# Patient Record
Sex: MALE
[De-identification: names, ages, dates, MRNs, and addresses within clinical notes are randomized per-mention and may not be internally consistent; named-entity substitution may affect disease eponyms.]

---

## 2019-03-22 PROBLEM — Z00.00 ENCOUNTER FOR PREVENTIVE HEALTH EXAMINATION: Status: ACTIVE | Noted: 2019-03-22

## 2019-04-08 ENCOUNTER — APPOINTMENT (OUTPATIENT)
Dept: COLORECTAL SURGERY | Facility: CLINIC | Age: 59
End: 2019-04-08

## 2019-05-03 ENCOUNTER — APPOINTMENT (OUTPATIENT)
Dept: COLORECTAL SURGERY | Facility: CLINIC | Age: 59
End: 2019-05-03

## 2025-03-04 ENCOUNTER — INPATIENT (INPATIENT)
Facility: HOSPITAL | Age: 65
LOS: 1 days | Discharge: HOME HEALTH | End: 2025-03-06
Attending: STUDENT IN AN ORGANIZED HEALTH CARE EDUCATION/TRAINING PROGRAM | Admitting: STUDENT IN AN ORGANIZED HEALTH CARE EDUCATION/TRAINING PROGRAM
Payer: COMMERCIAL

## 2025-03-04 ENCOUNTER — NON-APPOINTMENT (OUTPATIENT)
Age: 65
End: 2025-03-04

## 2025-03-04 ENCOUNTER — APPOINTMENT (OUTPATIENT)
Dept: UROLOGY | Facility: CLINIC | Age: 65
End: 2025-03-04
Payer: COMMERCIAL

## 2025-03-04 VITALS
DIASTOLIC BLOOD PRESSURE: 71 MMHG | HEART RATE: 67 BPM | WEIGHT: 185 LBS | BODY MASS INDEX: 28.04 KG/M2 | TEMPERATURE: 98.1 F | HEIGHT: 68 IN | SYSTOLIC BLOOD PRESSURE: 145 MMHG

## 2025-03-04 VITALS
WEIGHT: 184.97 LBS | RESPIRATION RATE: 20 BRPM | DIASTOLIC BLOOD PRESSURE: 69 MMHG | HEART RATE: 72 BPM | OXYGEN SATURATION: 98 % | TEMPERATURE: 98 F | SYSTOLIC BLOOD PRESSURE: 129 MMHG

## 2025-03-04 DIAGNOSIS — R82.81 PYURIA: ICD-10-CM

## 2025-03-04 DIAGNOSIS — N39.0 URINARY TRACT INFECTION, SITE NOT SPECIFIED: ICD-10-CM

## 2025-03-04 DIAGNOSIS — R33.9 RETENTION OF URINE, UNSPECIFIED: ICD-10-CM

## 2025-03-04 DIAGNOSIS — R35.89 OTHER POLYURIA: ICD-10-CM

## 2025-03-04 DIAGNOSIS — N17.9 ACUTE KIDNEY FAILURE, UNSPECIFIED: ICD-10-CM

## 2025-03-04 DIAGNOSIS — D64.9 ANEMIA, UNSPECIFIED: ICD-10-CM

## 2025-03-04 DIAGNOSIS — E87.29 OTHER ACIDOSIS: ICD-10-CM

## 2025-03-04 DIAGNOSIS — E87.5 HYPERKALEMIA: ICD-10-CM

## 2025-03-04 DIAGNOSIS — Z29.9 ENCOUNTER FOR PROPHYLACTIC MEASURES, UNSPECIFIED: ICD-10-CM

## 2025-03-04 LAB
ADD ON TEST-SPECIMEN IN LAB: SIGNIFICANT CHANGE UP
ANION GAP SERPL CALC-SCNC: 14 MMOL/L — SIGNIFICANT CHANGE UP (ref 5–17)
ANION GAP SERPL CALC-SCNC: 20 MMOL/L — HIGH (ref 5–17)
APPEARANCE UR: ABNORMAL
BASOPHILS # BLD AUTO: 0.03 K/UL — SIGNIFICANT CHANGE UP (ref 0–0.2)
BASOPHILS NFR BLD AUTO: 0.3 % — SIGNIFICANT CHANGE UP (ref 0–2)
BILIRUB UR-MCNC: NEGATIVE — SIGNIFICANT CHANGE UP
BUN SERPL-MCNC: 109 MG/DL — HIGH (ref 7–23)
BUN SERPL-MCNC: 84 MG/DL — HIGH (ref 7–23)
CALCIUM SERPL-MCNC: 9.4 MG/DL — SIGNIFICANT CHANGE UP (ref 8.4–10.5)
CALCIUM SERPL-MCNC: 9.5 MG/DL — SIGNIFICANT CHANGE UP (ref 8.4–10.5)
CHLORIDE SERPL-SCNC: 106 MMOL/L — SIGNIFICANT CHANGE UP (ref 96–108)
CHLORIDE SERPL-SCNC: 112 MMOL/L — HIGH (ref 96–108)
CO2 SERPL-SCNC: 20 MMOL/L — LOW (ref 22–31)
CO2 SERPL-SCNC: 23 MMOL/L — SIGNIFICANT CHANGE UP (ref 22–31)
COLOR SPEC: YELLOW — SIGNIFICANT CHANGE UP
CREAT SERPL-MCNC: 12.32 MG/DL — HIGH (ref 0.5–1.3)
CREAT SERPL-MCNC: 6.55 MG/DL — HIGH (ref 0.5–1.3)
DIFF PNL FLD: ABNORMAL
EGFR: 4 ML/MIN/1.73M2 — LOW
EGFR: 4 ML/MIN/1.73M2 — LOW
EGFR: 9 ML/MIN/1.73M2 — LOW
EGFR: 9 ML/MIN/1.73M2 — LOW
EOSINOPHIL # BLD AUTO: 0.02 K/UL — SIGNIFICANT CHANGE UP (ref 0–0.5)
EOSINOPHIL NFR BLD AUTO: 0.2 % — SIGNIFICANT CHANGE UP (ref 0–6)
GLUCOSE SERPL-MCNC: 125 MG/DL — HIGH (ref 70–99)
GLUCOSE SERPL-MCNC: 175 MG/DL — HIGH (ref 70–99)
GLUCOSE UR QL: NEGATIVE MG/DL — SIGNIFICANT CHANGE UP
HCT VFR BLD CALC: 34.9 % — LOW (ref 39–50)
HGB BLD-MCNC: 11.5 G/DL — LOW (ref 13–17)
IMM GRANULOCYTES NFR BLD AUTO: 0.4 % — SIGNIFICANT CHANGE UP (ref 0–0.9)
KETONES UR-MCNC: ABNORMAL MG/DL
LEUKOCYTE ESTERASE UR-ACNC: ABNORMAL
LYMPHOCYTES # BLD AUTO: 0.81 K/UL — LOW (ref 1–3.3)
LYMPHOCYTES # BLD AUTO: 8.1 % — LOW (ref 13–44)
MAGNESIUM SERPL-MCNC: 2.9 MG/DL — HIGH (ref 1.6–2.6)
MCHC RBC-ENTMCNC: 31.1 PG — SIGNIFICANT CHANGE UP (ref 27–34)
MCHC RBC-ENTMCNC: 33 G/DL — SIGNIFICANT CHANGE UP (ref 32–36)
MCV RBC AUTO: 94.3 FL — SIGNIFICANT CHANGE UP (ref 80–100)
MONOCYTES # BLD AUTO: 1.2 K/UL — HIGH (ref 0–0.9)
MONOCYTES NFR BLD AUTO: 12 % — SIGNIFICANT CHANGE UP (ref 2–14)
NEUTROPHILS # BLD AUTO: 7.91 K/UL — HIGH (ref 1.8–7.4)
NEUTROPHILS NFR BLD AUTO: 79 % — HIGH (ref 43–77)
NITRITE UR-MCNC: NEGATIVE — SIGNIFICANT CHANGE UP
NRBC BLD AUTO-RTO: 0 /100 WBCS — SIGNIFICANT CHANGE UP (ref 0–0)
PH UR: 5.5 — SIGNIFICANT CHANGE UP (ref 5–8)
PHOSPHATE SERPL-MCNC: 5.1 MG/DL — HIGH (ref 2.5–4.5)
PLATELET # BLD AUTO: 237 K/UL — SIGNIFICANT CHANGE UP (ref 150–400)
POTASSIUM SERPL-MCNC: 4.6 MMOL/L — SIGNIFICANT CHANGE UP (ref 3.5–5.3)
POTASSIUM SERPL-MCNC: 5.3 MMOL/L — SIGNIFICANT CHANGE UP (ref 3.5–5.3)
POTASSIUM SERPL-SCNC: 4.6 MMOL/L — SIGNIFICANT CHANGE UP (ref 3.5–5.3)
POTASSIUM SERPL-SCNC: 5.3 MMOL/L — SIGNIFICANT CHANGE UP (ref 3.5–5.3)
PROT UR-MCNC: 30 MG/DL
RBC # BLD: 3.7 M/UL — LOW (ref 4.2–5.8)
RBC # FLD: 14.7 % — HIGH (ref 10.3–14.5)
SODIUM SERPL-SCNC: 146 MMOL/L — HIGH (ref 135–145)
SODIUM SERPL-SCNC: 149 MMOL/L — HIGH (ref 135–145)
SP GR SPEC: 1.01 — SIGNIFICANT CHANGE UP (ref 1–1.03)
UROBILINOGEN FLD QL: 0.2 MG/DL — SIGNIFICANT CHANGE UP (ref 0.2–1)
WBC # BLD: 10.01 K/UL — SIGNIFICANT CHANGE UP (ref 3.8–10.5)
WBC # FLD AUTO: 10.01 K/UL — SIGNIFICANT CHANGE UP (ref 3.8–10.5)

## 2025-03-04 PROCEDURE — 93010 ELECTROCARDIOGRAM REPORT: CPT

## 2025-03-04 PROCEDURE — 99285 EMERGENCY DEPT VISIT HI MDM: CPT

## 2025-03-04 PROCEDURE — 51702 INSERT TEMP BLADDER CATH: CPT

## 2025-03-04 PROCEDURE — 99283 EMERGENCY DEPT VISIT LOW MDM: CPT

## 2025-03-04 PROCEDURE — 71045 X-RAY EXAM CHEST 1 VIEW: CPT | Mod: 26

## 2025-03-04 PROCEDURE — 99205 OFFICE O/P NEW HI 60 MIN: CPT | Mod: 25

## 2025-03-04 PROCEDURE — 99223 1ST HOSP IP/OBS HIGH 75: CPT

## 2025-03-04 RX ORDER — SODIUM ZIRCONIUM CYCLOSILICATE 5 G/5G
10 POWDER, FOR SUSPENSION ORAL ONCE
Refills: 0 | Status: COMPLETED | OUTPATIENT
Start: 2025-03-04 | End: 2025-03-04

## 2025-03-04 RX ORDER — SODIUM CHLORIDE 9 G/1000ML
1000 INJECTION, SOLUTION INTRAVENOUS
Refills: 0 | Status: DISCONTINUED | OUTPATIENT
Start: 2025-03-04 | End: 2025-03-05

## 2025-03-04 RX ORDER — CEFTRIAXONE 500 MG/1
1000 INJECTION, POWDER, FOR SOLUTION INTRAMUSCULAR; INTRAVENOUS ONCE
Refills: 0 | Status: COMPLETED | OUTPATIENT
Start: 2025-03-04 | End: 2025-03-04

## 2025-03-04 RX ORDER — POLYETHYLENE GLYCOL 3350 17 G/17G
17 POWDER, FOR SOLUTION ORAL EVERY 12 HOURS
Refills: 0 | Status: DISCONTINUED | OUTPATIENT
Start: 2025-03-04 | End: 2025-03-04

## 2025-03-04 RX ORDER — TAMSULOSIN HYDROCHLORIDE 0.4 MG/1
0.4 CAPSULE ORAL AT BEDTIME
Refills: 0 | Status: DISCONTINUED | OUTPATIENT
Start: 2025-03-04 | End: 2025-03-06

## 2025-03-04 RX ORDER — HEPARIN SODIUM 1000 [USP'U]/ML
5000 INJECTION INTRAVENOUS; SUBCUTANEOUS EVERY 8 HOURS
Refills: 0 | Status: DISCONTINUED | OUTPATIENT
Start: 2025-03-04 | End: 2025-03-06

## 2025-03-04 RX ORDER — SENNA 187 MG
2 TABLET ORAL AT BEDTIME
Refills: 0 | Status: DISCONTINUED | OUTPATIENT
Start: 2025-03-04 | End: 2025-03-04

## 2025-03-04 RX ORDER — INFLUENZA A VIRUS A/IDAHO/07/2018 (H1N1) ANTIGEN (MDCK CELL DERIVED, PROPIOLACTONE INACTIVATED, INFLUENZA A VIRUS A/INDIANA/08/2018 (H3N2) ANTIGEN (MDCK CELL DERIVED, PROPIOLACTONE INACTIVATED), INFLUENZA B VIRUS B/SINGAPORE/INFTT-16-0610/2016 ANTIGEN (MDCK CELL DERIVED, PROPIOLACTONE INACTIVATED), INFLUENZA B VIRUS B/IOWA/06/2017 ANTIGEN (MDCK CELL DERIVED, PROPIOLACTONE INACTIVATED) 15; 15; 15; 15 UG/.5ML; UG/.5ML; UG/.5ML; UG/.5ML
0.5 INJECTION, SUSPENSION INTRAMUSCULAR ONCE
Refills: 0 | Status: DISCONTINUED | OUTPATIENT
Start: 2025-03-04 | End: 2025-03-06

## 2025-03-04 RX ADMIN — SODIUM ZIRCONIUM CYCLOSILICATE 10 GRAM(S): 5 POWDER, FOR SUSPENSION ORAL at 20:48

## 2025-03-04 RX ADMIN — CEFTRIAXONE 100 MILLIGRAM(S): 500 INJECTION, POWDER, FOR SOLUTION INTRAMUSCULAR; INTRAVENOUS at 17:25

## 2025-03-04 RX ADMIN — Medication 2000 MILLILITER(S): at 16:38

## 2025-03-04 RX ADMIN — TAMSULOSIN HYDROCHLORIDE 0.4 MILLIGRAM(S): 0.4 CAPSULE ORAL at 23:18

## 2025-03-04 RX ADMIN — SODIUM CHLORIDE 125 MILLILITER(S): 9 INJECTION, SOLUTION INTRAVENOUS at 20:49

## 2025-03-04 NOTE — ED PROVIDER NOTE - OBJECTIVE STATEMENT
63 yo male otherwise healthy, sent to the ED by his Urologist after ann was placed in outpatient setting for urinary retention. Per patient, about 1L of urine was drained after placement of ann and another 1L of dark yellow was in the leg bag at time of presentation to the ED. He says that his urinary retention began a couple of weeks ago with N/V and lower abd discomfort and distension. No fevers/flank pain.

## 2025-03-04 NOTE — H&P ADULT - NSHPLABSRESULTS_GEN_ALL_CORE
.  LABS:                         11.5   10.01 )-----------( 237      ( 04 Mar 2025 15:53 )             34.9     03-04    146[H]  |  106  |  109[H]  ----------------------------<  125[H]  5.3   |  20[L]  |  12.32[H]    Ca    9.5      04 Mar 2025 15:53  Phos  7.4     03-04        Urinalysis Basic - ( 04 Mar 2025 15:53 )    Color: Yellow / Appearance: Cloudy / S.014 / pH: x  Gluc: 125 mg/dL / Ketone: Trace mg/dL  / Bili: Negative / Urobili: 0.2 mg/dL   Blood: x / Protein: 30 mg/dL / Nitrite: Negative   Leuk Esterase: Moderate / RBC: 303 /HPF / WBC 21 /HPF   Sq Epi: x / Non Sq Epi: 22 /HPF / Bacteria: Negative /HPF                RADIOLOGY, EKG & ADDITIONAL TESTS: Reviewed.

## 2025-03-04 NOTE — H&P ADULT - NSHPPHYSICALEXAM_GEN_ALL_CORE
T(C): 36.6 (03-04-25 @ 15:28), Max: 36.6 (03-04-25 @ 15:28)  HR: 72 (03-04-25 @ 15:28) (72 - 72)  BP: 129/69 (03-04-25 @ 15:28) (129/69 - 129/69)  RR: 20 (03-04-25 @ 15:28) (20 - 20)  SpO2: 98% (03-04-25 @ 15:28) (98% - 98%)    CONSTITUTIONAL: Well groomed, no apparent distress  EYES: PERRLA and symmetric, EOMI, No conjunctival or scleral injection, non-icteric  ENMT: Oral mucosa with moist membranes. Normal dentition; no pharyngeal injection or exudates             NECK: Supple, symmetric and without tracheal deviation   RESP: No respiratory distress, no use of accessory muscles; CTA b/l, no WRR  CV: RRR, +S1S2, no MRG; no JVD; no peripheral edema  GI: Soft, NT, ND, no rebound, no guarding; no palpable masses; no hepatosplenomegaly; no hernia palpated  LYMPH: No cervical LAD or tenderness; no axillary LAD or tenderness; no inguinal LAD or tenderness  MSK: Normal gait; No digital clubbing or cyanosis; examination of the (head/neck/spine/ribs/pelvis, RUE, LUE, RLE, LLE) without misalignment,            Normal ROM without pain, no spinal tenderness, normal muscle strength/tone  SKIN: No rashes or ulcers noted; no subcutaneous nodules or induration palpable  NEURO: CN II-XII intact; normal reflexes in upper and lower extremities, sensation intact in upper and lower extremities b/l to light touch   PSYCH: Appropriate insight/judgment; A+O x 3, mood and affect appropriate, recent/remote memory intact T(C): 36.6 (03-04-25 @ 15:28), Max: 36.6 (03-04-25 @ 15:28)  HR: 72 (03-04-25 @ 15:28) (72 - 72)  BP: 129/69 (03-04-25 @ 15:28) (129/69 - 129/69)  RR: 20 (03-04-25 @ 15:28) (20 - 20)  SpO2: 98% (03-04-25 @ 15:28) (98% - 98%)    CONSTITUTIONAL: Well groomed, no apparent distress  EYES: PERRLA and symmetric, EOMI, No conjunctival or scleral injection, non-icteric  ENMT: Oral mucosa with moist membranes. Normal dentition; no pharyngeal injection or exudates  RESP: No respiratory distress, no use of accessory muscles; CTA b/l, no WRR  CV: RRR, +S1S2, no MRG; no JVD; no peripheral edema  GI: Soft, NT, ND, no rebound, no guarding; no palpable masses; no hepatosplenomegaly; no hernia palpated  SKIN: No rashes or ulcers noted; no subcutaneous nodules or induration palpable  NEURO: CN II-XII intact; normal reflexes in upper and lower extremities, sensation intact in upper and lower extremities b/l to light touch   PSYCH: Appropriate insight/judgment; A+O x 3, mood and affect appropriate, recent/remote memory intact T(C): 36.6 (03-04-25 @ 15:28), Max: 36.6 (03-04-25 @ 15:28)  HR: 72 (03-04-25 @ 15:28) (72 - 72)  BP: 129/69 (03-04-25 @ 15:28) (129/69 - 129/69)  RR: 20 (03-04-25 @ 15:28) (20 - 20)  SpO2: 98% (03-04-25 @ 15:28) (98% - 98%)    CONSTITUTIONAL: Well groomed, no apparent distress  EYES: PERRLA and symmetric, EOMI, No conjunctival or scleral injection, non-icteric  ENMT: Oral mucosa with dry membranes. Normal dentition; no pharyngeal injection or exudates  RESP: No respiratory distress, no use of accessory muscles; CTA b/l, no WRR  CV: RRR, +S1S2, no MRG; no JVD; no peripheral edema  GI: Soft, NT, ND, no rebound, no guarding; no palpable masses; no hepatosplenomegaly; no hernia palpated  SKIN: No rashes or ulcers noted; no subcutaneous nodules or induration palpable  NEURO: moving all 4 extremities spontaneously  PSYCH: Appropriate insight/judgment; A+O x 3, mood and affect appropriate, recent/remote memory intact

## 2025-03-04 NOTE — PATIENT PROFILE ADULT - FALL HARM RISK - UNIVERSAL INTERVENTIONS
Bed in lowest position, wheels locked, appropriate side rails in place/Call bell, personal items and telephone in reach/Instruct patient to call for assistance before getting out of bed or chair/Non-slip footwear when patient is out of bed/[place] to call system/Physically safe environment - no spills, clutter or unnecessary equipment/Purposeful Proactive Rounding/Room/bathroom lighting operational, light cord in reach

## 2025-03-04 NOTE — H&P ADULT - PROBLEM SELECTOR PLAN 3
Favor in setting of SHORTY  - continue to monitor Favor in setting of uremia from SHORTY  - continue to monitor -Roxana jain ordered for K 5.3  -No EKG changes on 3/4 EKG  -Repeat BMP

## 2025-03-04 NOTE — H&P ADULT - HISTORY OF PRESENT ILLNESS
HPI: Patient is a 65 yo male with PMH ____ who presented to the ED for urinary retention. He was told to come in after seeing his outpatient urologist earlier today who placed a ann catheter. Per patient, about 1L of urine was drained after placement of ann and another 1L of dark yellow was in the leg bag at time of presentation to the ED.  Notes he also had some lower back pain but this pain resolved after ann was placed. Denies fevers/chills.    In the ED:  Initial vital signs: T: 97.9 F, HR: 72, BP: 129/69, R: 20, SpO2: 98% on RA  ED course:   Labs: significant for Hgb 11.5, Na 146, CO2 20, AG 20, , Cr 12.32; UA cloudy urine, 30 protein, trace ketone, large blood, 21 WBC, 303 RBC  Imaging: none  EKG: NSR, normal ECG,   Medications: ceftriaxone 1g x 1, 1L NS  Consults: none    HPI: Patient is a 65 yo male with no PMH who presented to the ED for urinary retention. He was told to come in after seeing his outpatient urologist earlier today who placed a ann catheter. Per patient, about 1L of urine was drained after placement of ann and another 1L of dark yellow was in the leg bag at time of presentation to the ED. He says that his urinary retention began after getting a sonogram (of the bladder? and testicles) on 2/23 for pain in that region. He states that right after the sonogram, he began experiencing significant/sharp pain in the area and noticed that he had dribbling of his urine. He was seen by his local doctor, who trialed flomax, but he only took one dose as he noticed he had some leaking of urine with this medication. This pain resolved after placement of the ann.     In the ED:  Initial vital signs: T: 97.9 F, HR: 72, BP: 129/69, R: 20, SpO2: 98% on RA  ED course:   Labs: significant for Hgb 11.5, Na 146, CO2 20, AG 20, , Cr 12.32; UA cloudy urine, 30 protein, trace ketone, large blood, 21 WBC, 303 RBC  Imaging: none  EKG: NSR, normal ECG,   Medications: ceftriaxone 1g x 1, 1L NS  Consults: none

## 2025-03-04 NOTE — CONSULT NOTE ADULT - ASSESSMENT
63 yo male with  no known medical illnesses  who presented to the ED 3/4   with post obstructive uropathy with rising creat 12.3 , , nephrology consulted for SHORTY  /    10Post-obstructive non- oliguric  SHORTY: Ann cath drained 2 L,  , Creat 12.3. Extended period of post obstruction can lead to intrinsic injury  Plan  -Maintain ann cath  -po lokelma 10 g x 1  -Strict I/o chart  -send UA, with microscopy  -For urine electrolytes  -For UPC/UAC ratio  -Avoid nephrotoxic agent  - For renal sono  -renally adjust meds for GFR <10  -No acute indication for HD  - will expect SHORTY to improve    2)Mild hypernatremia: post obstructive diuresis can cause hypernatremia  give 0.45% saline @ 100 cc/hr x 12 hrs  trend BMP daily    3) Urology consult

## 2025-03-04 NOTE — ED ADULT NURSE NOTE - OBJECTIVE STATEMENT
B positive 65 yo male c/o urinary retention. Pt referred to ED from urologist after having a ann catheter placed in office. Per pt's wife ann was placed approx. 1 hour prior to arrival, about 1L drained in office. 1L drained dark yellw urine from leg bag upon arrival to ED. Denies drinking fluids since catheter placement. Reports lower back pain since Sunday, reports pain resolved once Ann was placed. Denies fevers/chills.

## 2025-03-04 NOTE — H&P ADULT - PROBLEM SELECTOR PLAN 1
Patient presenting with Cr 12.32, unknown baseline but favor in setting of post obstructive SHORTY  Will need monitoring for post-obstructive diuresis  - trend BMP with aggressive electrolyte repletion  - strict I/O's  - maintenance fluids __ cc/hr; will titrate based on output Patient presenting with Cr 12.32, unknown baseline but favor in setting of post obstructive SHORTY  Will need monitoring for post-obstructive diuresis  - trend BMP with aggressive electrolyte repletion  - strict I/O's  - nephrology following, f/u recs - f/u urine lytes, retroperitoneal US  - maintenance [id number] cc/hr; will titrate based on output Patient presenting with Cr 12.32, unknown baseline but favor in setting of post obstructive SHORTY  Will need monitoring for post-obstructive diuresis  - trend BMP with aggressive electrolyte repletion  - strict I/O's  - nephrology following, f/u recs - f/u urine lytes, retroperitoneal US  - urology following, recommended empiric antibiotics  - maintenance [id number] cc/hr; will titrate based on output Patient presenting with Cr 12.32, unknown baseline but favor in setting of post obstructive SHORTY  Will need monitoring for post-obstructive diuresis  - trend BMP with aggressive electrolyte repletion  - strict I/O's  - nephrology following, f/u recs - f/u urine lytes, retroperitoneal US  - urology following, recommended empiric antibiotics  - maintenance fluids 125 cc/hr; will titrate based on output Patient presenting with Cr 12.32, unknown baseline but favor in setting of post obstructive SHORTY  Will need monitoring for post-obstructive diuresis  - trend BMP with aggressive electrolyte repletion  - strict I/O's  - nephrology following, f/u recs - f/u urine lytes, retroperitoneal US  - urology following, s/p outpatient Shipley placement 3/4  - maintenance fluids 125 cc/hr; will titrate based on output

## 2025-03-04 NOTE — H&P ADULT - PROBLEM SELECTOR PLAN 2
UA on admission showing 21 WBC F: maintenance  cc/hr  E: replete aggressively  N: regular  GI: none  DVT: heparin  Dispo: [facility] Plan as above

## 2025-03-04 NOTE — CONSULT NOTE ADULT - ASSESSMENT
64 year old male with urinary retention now with POD Latrice 64M admitted to medicine 2/2 urinary retention concern for post-obstructive diuresis. Catheter placed with >1.7L UOP, now sent to the ED. Reports past few weeks of poor urinary stream, dribbling, no infectious signs or symptoms, no nausea/vomiting. Had bilateral flank pain and suprapubic pain now improved. Afebrile, vital signs stable in ED. Exam unremarkable for b/l CVAT or SP pain, some penile pain with catheter and will get lidocaine gel. Urine clear yellow, UA moderate LE, neg nitrite, 21 WBC, and was started on CTX by ED, UCx sent and pending. WBC 10.01, hgb 11.5, Cr 12.32 (do not have baseline available, wife said they had paperwork but unsure where now). Remainder of chem panel with K 5.3, Na 146, phos 7.4, pending Mg. Nephrology also consulted and will provide recs    -Maintain ann for strict I/Os   -Trend BMP q6hrs for first 24 hours, then daily if stable. Will monitor creatinine for improvement  -order RBUS  -Replete volume 0.5:1 ratio. Replete lytes PRN  -Nightly flomax  -f/u UA/UCx  -OOBA/PT if needed  -Bowel regimen/minimize opiates/anti-cholinergics  -will follow care per medicine and nephrology

## 2025-03-04 NOTE — ED ADULT TRIAGE NOTE - CHIEF COMPLAINT QUOTE
pt c/o urinary retention x 2 days. had a ann placed at outpatient urology today. states, "my urologist wanted me to come in to find out the cause."

## 2025-03-04 NOTE — H&P ADULT - PROBLEM SELECTOR PLAN 6
F: maintenance  cc/hr  E: replete aggressively  N: regular  GI: none  DVT: heparin  Dispo: [facility] F: maintenance  cc/hr  E: replete aggressively  N: renal diet  GI: none  DVT: heparin  Dispo: [facility] Normocytic anemia  No s/s active bleeding  - AM iron studies  - active T&S

## 2025-03-04 NOTE — H&P ADULT - PROBLEM SELECTOR PLAN 7
F: maintenance  cc/hr  E: replete aggressively  N: renal diet  GI: none  DVT: heparin  Dispo: [facility]

## 2025-03-04 NOTE — CONSULT NOTE ADULT - PROBLEM SELECTOR RECOMMENDATION 2
-recommend nephro consult  -trend Cr  -follow up urine lytes  -continue catheter in the setting of presumed obstructive SHORTY  -no TOV at this time  -avoid nephrotoxic agents

## 2025-03-04 NOTE — H&P ADULT - ASSESSMENT
Patient is a 63 yo male with no PMH who presented to the ED for urinary retention, found to have SHORTY likely post-obstructive in nature.

## 2025-03-04 NOTE — ED PROVIDER NOTE - EKG #1 DATE/TIME
Detail Level: Zone Detail Level: Detailed Detail Level: Simple Detail Level: Generalized 04-Mar-2025 17:25

## 2025-03-04 NOTE — CONSULT NOTE ADULT - SUBJECTIVE AND OBJECTIVE BOX
NEPHROLOGY SERVICE INITIAL CONSULT NOTE    HPI: Patient is a 63 yo male with  no known medical illnesses  who presented to the ED 3/4  for  acute urinary retention. He was told to come in after seeing his outpatient urologist earlier  who placed a ann catheter. Per patient, about 1L of urine was drained after placement of ann and another 1L of dark yellow was in the leg bag at time of presentation to the ED.  Notes he also had some lower back pain but this pain resolved after ann was placed. Denies fevers/chills. Pt states he has been having intermittent lower abdominal pain since   accompanied by dribbling and difficulties passing urine.  He saw a PCP 3/3 who  straight cath him, but minimal amount of urine came out, took some flomax with little relief. Pain intensified and went to see the urologist 3/4 where ann cath was place  and referred to ED. Nephrology consulted for SHORTY with Creat 12.3 ,      In the ED:  Initial vital signs: T: 97.9 F, HR: 72, BP: 129/69, R: 20, SpO2: 98% on RA  ED course:   Labs: significant for Hgb 11.5, Na 146, CO2 20, AG 20, , Cr 12.32; UA cloudy urine, 30 protein, trace ketone, large blood, 21 WBC, 303 RBC  Imaging: none  EKG: NSR, normal ECG,   Medications: ceftriaxone 1g x 1, 1L NS  Consults: none    (04 Mar 2025 17:48)        REVIEW OF SYSTEMS: Otherwise negative except as specified in HPI  PAST MEDICAL & SURGICAL HISTORY:    FAMILY HISTORY:    SOCIAL HISTORY:  HOME MEDICATIONS:    Allergies    No Known Allergies    Intolerances        ACTIVE MEDICATIONS:  MEDICATIONS  (STANDING):    MEDICATIONS  (PRN):        VITAL SIGNS:  Vital Signs Last 24 Hrs  T(C): 36.7 (04 Mar 2025 18:04), Max: 36.7 (04 Mar 2025 18:04)  T(F): 98 (04 Mar 2025 18:04), Max: 98 (04 Mar 2025 18:04)  HR: 76 (04 Mar 2025 18:04) (72 - 76)  BP: 157/82 (04 Mar 2025 18:04) (129/69 - 157/82)  BP(mean): --  RR: 18 (04 Mar 2025 18:04) (18 - 20)  SpO2: 96% (04 Mar 2025 18:04) (96% - 98%)    Parameters below as of 04 Mar 2025 18:04  Patient On (Oxygen Delivery Method): room air          PE:  General: Not in acute distress, well-nourished  Chest: CTAP b/l, no use of accessory respiratory muscles  Heart: RRR, S1/S2 wnl, no MRG  Abdomen: Soft, nontender, nondistended  Extremities: No edema  Neuro:  Alert, no apparent focal deficits, answer questions appropriately      Pertinent labs & Imagin.5   10.01 )-----------( 237      ( 04 Mar 2025 15:53 )             34.9     03-04    146[H]  |  106  |  109[H]  ----------------------------<  125[H]  5.3   |  20[L]  |  12.32[H]    Ca    9.5      04 Mar 2025 15:53  Phos  7.4     03-04        Urinalysis Basic - ( 04 Mar 2025 15:53 )    Color: Yellow / Appearance: Cloudy / S.014 / pH: x  Gluc: 125 mg/dL / Ketone: Trace mg/dL  / Bili: Negative / Urobili: 0.2 mg/dL   Blood: x / Protein: 30 mg/dL / Nitrite: Negative   Leuk Esterase: Moderate / RBC: 303 /HPF / WBC 21 /HPF   Sq Epi: x / Non Sq Epi: 22 /HPF / Bacteria: Negative /HPF          CAPILLARY BLOOD GLUCOSE              RADIOLOGY & ADDITIONAL TESTS: Reviewed.
Patient is a 64y old  Male who presents with a chief complaint of Post obstructive HSORTY/diuresis (04 Mar 2025 17:48)      HPI:  HPI: Patient is a 65 yo male with PMH BPH who presented to the ED for urinary retention. He was told to come in after seeing his outpatient urologist earlier today who placed a ann catheter. Per patient, about 1L of urine was drained after placement of ann and another 1L of dark yellow was in the leg bag at time of presentation to the ED.  Notes he also had some lower back pain but this pain resolved after ann was placed. Denies fevers/chills.     Note: Above as discussed. Patient presented to Dr De Luna office today with urinary urgency, dribbling, suprapubic pain and CVAT. Ann catheter inserted in office and patient was referred to the ED for labs for further evaluation of status. In the ED labs significant for SHORTY to Cr 12. Patient continues to empty rapidly with concern for POD. At the time of evaluation patient denies any acute complains as urinary symptoms alleviated by catheter placement. Denies fever, chills, dysuria, hematuria.    In the ED:  Initial vital signs: T: 97.9 F, HR: 72, BP: 129/69, R: 20, SpO2: 98% on RA  ED course:   Labs: significant for Hgb 11.5, Na 146, CO2 20, AG 20, , Cr 12.32; UA cloudy urine, 30 protein, trace ketone, large blood, 21 WBC, 303 RBC  Imaging: none  EKG: NSR, normal ECG,   Medications: ceftriaxone 1g x 1, 1L NS  Consults: none    (04 Mar 2025 17:48)      Vital Signs Last 24 Hrs  T(C): 36.7 (04 Mar 2025 18:04), Max: 36.7 (04 Mar 2025 18:04)  T(F): 98 (04 Mar 2025 18:04), Max: 98 (04 Mar 2025 18:04)  HR: 76 (04 Mar 2025 18:04) (72 - 76)  BP: 157/82 (04 Mar 2025 18:04) (129/69 - 157/82)  BP(mean): --  RR: 18 (04 Mar 2025 18:04) (18 - 20)  SpO2: 96% (04 Mar 2025 18:04) (96% - 98%)    Parameters below as of 04 Mar 2025 18:04  Patient On (Oxygen Delivery Method): room air      I&O's Summary      PE:  Gen: NAD  Abd: soft, nt, nd  : ann catheter in place, draining yellow clear, no CVAT      LABS:                        11.5   10.01 )-----------( 237      ( 04 Mar 2025 15:53 )             34.9     03-04    146[H]  |  106  |  109[H]  ----------------------------<  125[H]  5.3   |  20[L]  |  12.32[H]    Ca    9.5      04 Mar 2025 15:53  Phos  7.4     03-04

## 2025-03-04 NOTE — H&P ADULT - ATTENDING COMMENTS
63 yo M with PMHx ?BPH px from outpatient urology office s/p Shipley placement i/s/o urinary retention with 1L UOP, admitted for further monitoring of AGMA with SHORTY and post-obstructive diuresis.      VSS. EKG reviewed (NSR, ). No prior EKG available for comparison. Labs reviewed. Hb 11.5 (MCV 94.3). Na 146. K 5.3. No associated EKG changes. Bicarb 20. AG 20. . Cr 12.32. Phos 7.4. No known hx of CKD. No prior labs available for comparison. Suspect component of obstructive SHORTY. Pt with reported 1L UOP immediately after Shipley placement at outpatient urology office prior to ED arrival and additional 1L UOP in ED. UA with 30 protein, trace ketones, large blood/RBC, moderate LE, 21 WBC, negative bacteria, +epithelial cells. Apart from urinary retention, pt without other  complaints. No sx to suggest acute infection/UTI and otherwise not meeting SIRS/sepsis criteria. No additional imaging performed at this time. Urology consulted in ED with recommendations to continue Shipley at this time. Unclear if ever formally dx with BPH however appears to have been prescribed Flomax recently which pt reports taking only 1-2 doses of. Renal consulted in ED given AGMA and SHORTY with significant Cr elevation however pt not otherwise indicated for urgent/emergent HD at this time. Most likely obstructive SHORTY vs SHORTY on CKD with expected improvement s/p Shipley placement.      [ ] 10PM – BMP, Mg, Phos (Ordered)     [ ] Q8-12 BMP monitoring (Replete electrolytes PRN)   [ ] Strict UOP monitoring    [ ] mIVF with 1/2 NS @ 125cc/hr (Adjust to ~50% hourly rate UOP)   [ ] Monitor off abx (Asymptomatic pyuria)    [ ] Renal consulted in ED (SHORTY)   - US Renal (Ordered)   - Lokelma 10g x1 for K 5.3 > Repeat BMP   - ULytes (Ordered)   - Renal diet for now > Liberalize as SHORTY improves    [ ] Urology consulted in ED (Urinary retention)   - Shipley placed 3/4 > Hold off on TOV at this time   - Tamsulosin 0.4mg Qhs

## 2025-03-04 NOTE — CONSULT NOTE ADULT - PROBLEM SELECTOR RECOMMENDATION 9
-no emergent  surgical intervention indicated at this time  -continue ann catheter  -no TOV at this time  -follow up Ua, Ucx, Urine lytes  -recommend empiric Abx  -POD- continue monitoring urine output with strict I&Os, serial BMPs, replete PRN

## 2025-03-04 NOTE — H&P ADULT - PROBLEM SELECTOR PLAN 4
UA showing 21 WBC but no bacteria  Patient denying any urinary symptoms  s/p 1g CTX in ED  - monitor off abx Favor in setting of uremia from SHORTY  - continue to monitor

## 2025-03-04 NOTE — ED PROVIDER NOTE - CLINICAL SUMMARY MEDICAL DECISION MAKING FREE TEXT BOX
65 yo male otherwise healthy, sent to the ED by his Urologist after ann was placed in outpatient setting for urinary retention. Per patient, about 1L of urine was drained after placement of ann and another 1L of dark yellow was in the leg bag at time of presentation to the ED. He says that his urinary retention began a couple of weeks ago with N/V and lower abd discomfort and distension. No fevers/flank pain.     In the ED:  Initial vital signs: T: 97.9 F, HR: 72, BP: 129/69, R: 20, SpO2: 98% on RA  ED course:   Labs: significant for Hgb 11.5, Na 146, CO2 20, AG 20, , Cr 12.32; UA cloudy urine, 30 protein, trace ketone, large blood, 21 WBC, 303 RBC  Imaging: none  EKG: NSR, normal ECG,   Medications: ceftriaxone 1g x 1, 1L NS  Consults: none  Pt admitted for acute renal failure secondary to urinary retention and UTI,. Stable in ED.   In the ED:  Initial vital signs: T: 97.9 F, HR: 72, BP: 129/69, R: 20, SpO2: 98% on RA  ED course:   Labs: significant for Hgb 11.5, Na 146, CO2 20, AG 20, , Cr 12.32; UA cloudy urine, 30 protein, trace ketone, large blood, 21 WBC, 303 RBC  Imaging: none  EKG: NSR, normal ECG,   Medications: ceftriaxone 1g x 1, 1L NS  Consults: none

## 2025-03-04 NOTE — H&P ADULT - NSHPSOCIALHISTORY_GEN_ALL_CORE
Occupation:  Tobacco use:   EtOH use:  Drug use:  Living situation:  Mobility: Tobacco use: denies  EtOH use: denies  Drug use: denies  Living situation: lives with wife and family  Mobility: independent in ADLs, ambulates independently

## 2025-03-04 NOTE — H&P ADULT - PROBLEM SELECTOR PLAN 5
Normocytic anemia  - AM iron studies, B12, folate, reticulocyte count Normocytic anemia  No s/s active bleeding  - AM iron studies, B12, folate, reticulocyte count  - active T&S Normocytic anemia  No s/s active bleeding  - AM iron studies  - active T&S UA showing 21 WBC but no bacteria  Patient denying any urinary symptoms  s/p 1g CTX in ED  - monitor off abx

## 2025-03-05 LAB
ANION GAP SERPL CALC-SCNC: 11 MMOL/L — SIGNIFICANT CHANGE UP (ref 5–17)
ANION GAP SERPL CALC-SCNC: 14 MMOL/L — SIGNIFICANT CHANGE UP (ref 5–17)
ANION GAP SERPL CALC-SCNC: 16 MMOL/L — SIGNIFICANT CHANGE UP (ref 5–17)
BASOPHILS # BLD AUTO: 0.05 K/UL — SIGNIFICANT CHANGE UP (ref 0–0.2)
BASOPHILS NFR BLD AUTO: 0.9 % — SIGNIFICANT CHANGE UP (ref 0–2)
BLD GP AB SCN SERPL QL: NEGATIVE — SIGNIFICANT CHANGE UP
BUN SERPL-MCNC: 32 MG/DL — HIGH (ref 7–23)
BUN SERPL-MCNC: 40 MG/DL — HIGH (ref 7–23)
BUN SERPL-MCNC: 52 MG/DL — HIGH (ref 7–23)
CALCIUM SERPL-MCNC: 8.9 MG/DL — SIGNIFICANT CHANGE UP (ref 8.4–10.5)
CALCIUM SERPL-MCNC: 9 MG/DL — SIGNIFICANT CHANGE UP (ref 8.4–10.5)
CALCIUM SERPL-MCNC: 9.1 MG/DL — SIGNIFICANT CHANGE UP (ref 8.4–10.5)
CHLORIDE SERPL-SCNC: 112 MMOL/L — HIGH (ref 96–108)
CHLORIDE SERPL-SCNC: 114 MMOL/L — HIGH (ref 96–108)
CHLORIDE SERPL-SCNC: 115 MMOL/L — HIGH (ref 96–108)
CO2 SERPL-SCNC: 22 MMOL/L — SIGNIFICANT CHANGE UP (ref 22–31)
CO2 SERPL-SCNC: 24 MMOL/L — SIGNIFICANT CHANGE UP (ref 22–31)
CO2 SERPL-SCNC: 25 MMOL/L — SIGNIFICANT CHANGE UP (ref 22–31)
CREAT SERPL-MCNC: 1.32 MG/DL — HIGH (ref 0.5–1.3)
CREAT SERPL-MCNC: 1.75 MG/DL — HIGH (ref 0.5–1.3)
CREAT SERPL-MCNC: 2.55 MG/DL — HIGH (ref 0.5–1.3)
CYSTATIN C SERPL-MCNC: 1.17 MG/L — SIGNIFICANT CHANGE UP (ref 0.77–1.42)
EGFR: 27 ML/MIN/1.73M2 — LOW
EGFR: 27 ML/MIN/1.73M2 — LOW
EGFR: 43 ML/MIN/1.73M2 — LOW
EGFR: 43 ML/MIN/1.73M2 — LOW
EGFR: 60 ML/MIN/1.73M2 — SIGNIFICANT CHANGE UP
EGFR: 60 ML/MIN/1.73M2 — SIGNIFICANT CHANGE UP
EOSINOPHIL # BLD AUTO: 0 K/UL — SIGNIFICANT CHANGE UP (ref 0–0.5)
EOSINOPHIL NFR BLD AUTO: 0 % — SIGNIFICANT CHANGE UP (ref 0–6)
FERRITIN SERPL-MCNC: 190 NG/ML — SIGNIFICANT CHANGE UP (ref 30–400)
GFR/BSA.PRED SERPLBLD CYS-BASED-ARV: 62 ML/MIN/1.73M2 — SIGNIFICANT CHANGE UP
GLUCOSE SERPL-MCNC: 148 MG/DL — HIGH (ref 70–99)
GLUCOSE SERPL-MCNC: 88 MG/DL — SIGNIFICANT CHANGE UP (ref 70–99)
GLUCOSE SERPL-MCNC: 94 MG/DL — SIGNIFICANT CHANGE UP (ref 70–99)
HCT VFR BLD CALC: 32.3 % — LOW (ref 39–50)
HGB BLD-MCNC: 10.7 G/DL — LOW (ref 13–17)
IRON SATN MFR SERPL: 18 % — SIGNIFICANT CHANGE UP (ref 16–55)
IRON SATN MFR SERPL: 37 UG/DL — LOW (ref 45–165)
LYMPHOCYTES # BLD AUTO: 0.84 K/UL — LOW (ref 1–3.3)
LYMPHOCYTES # BLD AUTO: 15 % — SIGNIFICANT CHANGE UP (ref 13–44)
MAGNESIUM SERPL-MCNC: 2.3 MG/DL — SIGNIFICANT CHANGE UP (ref 1.6–2.6)
MAGNESIUM SERPL-MCNC: 2.6 MG/DL — SIGNIFICANT CHANGE UP (ref 1.6–2.6)
MAGNESIUM SERPL-MCNC: 2.7 MG/DL — HIGH (ref 1.6–2.6)
MCHC RBC-ENTMCNC: 30.9 PG — SIGNIFICANT CHANGE UP (ref 27–34)
MCHC RBC-ENTMCNC: 33.1 G/DL — SIGNIFICANT CHANGE UP (ref 32–36)
MCV RBC AUTO: 93.4 FL — SIGNIFICANT CHANGE UP (ref 80–100)
MONOCYTES # BLD AUTO: 0.94 K/UL — HIGH (ref 0–0.9)
MONOCYTES NFR BLD AUTO: 16.8 % — HIGH (ref 2–14)
NEUTROPHILS # BLD AUTO: 3.78 K/UL — SIGNIFICANT CHANGE UP (ref 1.8–7.4)
NEUTROPHILS NFR BLD AUTO: 67.3 % — SIGNIFICANT CHANGE UP (ref 43–77)
PHOSPHATE SERPL-MCNC: 2.2 MG/DL — LOW (ref 2.5–4.5)
PHOSPHATE SERPL-MCNC: 2.3 MG/DL — LOW (ref 2.5–4.5)
PHOSPHATE SERPL-MCNC: 3.5 MG/DL — SIGNIFICANT CHANGE UP (ref 2.5–4.5)
PLATELET # BLD AUTO: 223 K/UL — SIGNIFICANT CHANGE UP (ref 150–400)
POTASSIUM SERPL-MCNC: 4 MMOL/L — SIGNIFICANT CHANGE UP (ref 3.5–5.3)
POTASSIUM SERPL-MCNC: 4.1 MMOL/L — SIGNIFICANT CHANGE UP (ref 3.5–5.3)
POTASSIUM SERPL-MCNC: 4.3 MMOL/L — SIGNIFICANT CHANGE UP (ref 3.5–5.3)
POTASSIUM SERPL-SCNC: 4 MMOL/L — SIGNIFICANT CHANGE UP (ref 3.5–5.3)
POTASSIUM SERPL-SCNC: 4.1 MMOL/L — SIGNIFICANT CHANGE UP (ref 3.5–5.3)
POTASSIUM SERPL-SCNC: 4.3 MMOL/L — SIGNIFICANT CHANGE UP (ref 3.5–5.3)
RBC # BLD: 3.46 M/UL — LOW (ref 4.2–5.8)
RBC # FLD: 14.6 % — HIGH (ref 10.3–14.5)
RH IG SCN BLD-IMP: POSITIVE — SIGNIFICANT CHANGE UP
SODIUM SERPL-SCNC: 148 MMOL/L — HIGH (ref 135–145)
SODIUM SERPL-SCNC: 152 MMOL/L — HIGH (ref 135–145)
SODIUM SERPL-SCNC: 153 MMOL/L — HIGH (ref 135–145)
TIBC SERPL-MCNC: 211 UG/DL — LOW (ref 220–430)
UIBC SERPL-MCNC: 174 UG/DL — SIGNIFICANT CHANGE UP (ref 110–370)
WBC # BLD: 5.61 K/UL — SIGNIFICANT CHANGE UP (ref 3.8–10.5)
WBC # FLD AUTO: 5.61 K/UL — SIGNIFICANT CHANGE UP (ref 3.8–10.5)

## 2025-03-05 PROCEDURE — 76770 US EXAM ABDO BACK WALL COMP: CPT | Mod: 26

## 2025-03-05 PROCEDURE — 99233 SBSQ HOSP IP/OBS HIGH 50: CPT

## 2025-03-05 RX ORDER — SODIUM CHLORIDE 9 G/1000ML
1000 INJECTION, SOLUTION INTRAVENOUS
Refills: 0 | Status: DISCONTINUED | OUTPATIENT
Start: 2025-03-05 | End: 2025-03-05

## 2025-03-05 RX ORDER — SODIUM CHLORIDE 9 G/1000ML
1000 INJECTION, SOLUTION INTRAVENOUS
Refills: 0 | Status: DISCONTINUED | OUTPATIENT
Start: 2025-03-05 | End: 2025-03-06

## 2025-03-05 RX ORDER — SODIUM CHLORIDE 9 G/1000ML
1300 INJECTION, SOLUTION INTRAVENOUS
Refills: 0 | Status: DISCONTINUED | OUTPATIENT
Start: 2025-03-05 | End: 2025-03-05

## 2025-03-05 RX ORDER — POTASSIUM PHOSPHATE, MONOBASIC POTASSIUM PHOSPHATE, DIBASIC INJECTION, 236; 224 MG/ML; MG/ML
30 SOLUTION, CONCENTRATE INTRAVENOUS ONCE
Refills: 0 | Status: COMPLETED | OUTPATIENT
Start: 2025-03-05 | End: 2025-03-05

## 2025-03-05 RX ORDER — CEFTRIAXONE 500 MG/1
1000 INJECTION, POWDER, FOR SOLUTION INTRAMUSCULAR; INTRAVENOUS EVERY 24 HOURS
Refills: 0 | Status: DISCONTINUED | OUTPATIENT
Start: 2025-03-05 | End: 2025-03-06

## 2025-03-05 RX ORDER — SOD PHOS DI, MONO/K PHOS MONO 250 MG
1 TABLET ORAL ONCE
Refills: 0 | Status: COMPLETED | OUTPATIENT
Start: 2025-03-05 | End: 2025-03-05

## 2025-03-05 RX ORDER — SODIUM CHLORIDE 9 G/1000ML
500 INJECTION, SOLUTION INTRAVENOUS
Refills: 0 | Status: DISCONTINUED | OUTPATIENT
Start: 2025-03-05 | End: 2025-03-05

## 2025-03-05 RX ORDER — ACETAMINOPHEN 500 MG/5ML
650 LIQUID (ML) ORAL ONCE
Refills: 0 | Status: COMPLETED | OUTPATIENT
Start: 2025-03-05 | End: 2025-03-05

## 2025-03-05 RX ORDER — SODIUM CHLORIDE 9 G/1000ML
1800 INJECTION, SOLUTION INTRAVENOUS
Refills: 0 | Status: DISCONTINUED | OUTPATIENT
Start: 2025-03-05 | End: 2025-03-05

## 2025-03-05 RX ADMIN — HEPARIN SODIUM 5000 UNIT(S): 1000 INJECTION INTRAVENOUS; SUBCUTANEOUS at 22:52

## 2025-03-05 RX ADMIN — TAMSULOSIN HYDROCHLORIDE 0.4 MILLIGRAM(S): 0.4 CAPSULE ORAL at 22:52

## 2025-03-05 RX ADMIN — SODIUM CHLORIDE 125 MILLILITER(S): 9 INJECTION, SOLUTION INTRAVENOUS at 04:34

## 2025-03-05 RX ADMIN — Medication 650 MILLIGRAM(S): at 10:02

## 2025-03-05 RX ADMIN — HEPARIN SODIUM 5000 UNIT(S): 1000 INJECTION INTRAVENOUS; SUBCUTANEOUS at 14:31

## 2025-03-05 RX ADMIN — Medication 650 MILLIGRAM(S): at 09:32

## 2025-03-05 RX ADMIN — Medication 1 PACKET(S): at 18:04

## 2025-03-05 RX ADMIN — SODIUM CHLORIDE 80 MILLILITER(S): 9 INJECTION, SOLUTION INTRAVENOUS at 20:03

## 2025-03-05 RX ADMIN — SODIUM CHLORIDE 500 MILLILITER(S): 9 INJECTION, SOLUTION INTRAVENOUS at 13:02

## 2025-03-05 RX ADMIN — CEFTRIAXONE 100 MILLIGRAM(S): 500 INJECTION, POWDER, FOR SOLUTION INTRAMUSCULAR; INTRAVENOUS at 17:56

## 2025-03-05 RX ADMIN — SODIUM CHLORIDE 125 MILLILITER(S): 9 INJECTION, SOLUTION INTRAVENOUS at 13:03

## 2025-03-05 NOTE — PROGRESS NOTE ADULT - SUBJECTIVE AND OBJECTIVE BOX
OVERNIGHT/INTERVAL EVENTS: No acute events overnight, VSS    SUBJECTIVE: Pt slept well overnight. He does not report pain of the flank or suprapubic region. -N/V, fever, chills, night sweats, headache, dizziness, visual changes, shortness of breath, chest pain, difficulty breathing, abdominal pain, swelling, numbness, or tingling.     OBJECTIVE:    Vital Signs Last 24 Hrs  T(C): 36.8 (05 Mar 2025 06:07), Max: 36.9 (04 Mar 2025 18:04)  T(F): 98.3 (05 Mar 2025 06:07), Max: 98.5 (04 Mar 2025 18:04)  HR: 75 (05 Mar 2025 06:07) (72 - 88)  BP: 128/68 (05 Mar 2025 06:07) (112/65 - 157/78)  RR: 19 (05 Mar 2025 06:07) (18 - 20)  SpO2: 93% (05 Mar 2025 06:07) (92% - 98%)    Parameters below as of 05 Mar 2025 06:07  Patient On (Oxygen Delivery Method): room air      CONSTITUTIONAL: Well groomed, no apparent distress  EYES: EOMI, No conjunctival or scleral injection, non-icteric  ENMT: MMM. Normal dentition  RESP: No respiratory distress, no use of accessory muscles; CTA b/l, no WRR  CV: RRR, +S1S2, no MRG; no peripheral edema  GI: Soft, NT, ND, no rebound, no guarding; no palpable masses; no hepatosplenomegaly  NEURO: No focal deficits, motor strength and tone grossly intact  PSYCH: Appropriate insight/judgment; A&Ox3, mood and affect appropriate, recent/remote memory intact    I&O's Detail    04 Mar 2025 07:01  -  05 Mar 2025 07:00  --------------------------------------------------------  IN:  Total IN: 0 mL    OUT:    Voided (mL): 1000 mL  Total OUT: 1000 mL    Total NET: -1000 mL      LABS:                        11.5   10.01 )-----------( 237      ( 04 Mar 2025 15:53 )             34.9     03-04    149[H]  |  112[H]  |  84[H]  ----------------------------<  175[H]  4.6   |  23  |  6.55[H]    Ca    9.4      04 Mar 2025 22:00  Phos  5.1     03-04  Mg     2.9     03-04        Urinalysis Basic - ( 04 Mar 2025 22:00 )    Color: x / Appearance: x / SG: x / pH: x  Gluc: 175 mg/dL / Ketone: x  / Bili: x / Urobili: x   Blood: x / Protein: x / Nitrite: x   Leuk Esterase: x / RBC: x / WBC x   Sq Epi: x / Non Sq Epi: x / Bacteria: x      CAPILLARY BLOOD GLUCOSE        MEDICATIONS  (STANDING):  heparin   Injectable 5000 Unit(s) SubCutaneous every 8 hours  influenza   Vaccine 0.5 milliLiter(s) IntraMuscular once  sodium chloride 0.45%. 1000 milliLiter(s) (125 mL/Hr) IV Continuous <Continuous>  tamsulosin 0.4 milliGRAM(s) Oral at bedtime    MEDICATIONS  (PRN):   OVERNIGHT/INTERVAL EVENTS: No acute events overnight, VSS    SUBJECTIVE: Pt slept well overnight. He does not report pain of the flank or suprapubic region. -N/V, fever, chills, night sweats, headache, dizziness, visual changes, shortness of breath, chest pain, difficulty breathing, abdominal pain, swelling, numbness, or tingling.     OBJECTIVE:    Vital Signs Last 24 Hrs  T(C): 36.8 (05 Mar 2025 06:07), Max: 36.9 (04 Mar 2025 18:04)  T(F): 98.3 (05 Mar 2025 06:07), Max: 98.5 (04 Mar 2025 18:04)  HR: 75 (05 Mar 2025 06:07) (72 - 88)  BP: 128/68 (05 Mar 2025 06:07) (112/65 - 157/78)  RR: 19 (05 Mar 2025 06:07) (18 - 20)  SpO2: 93% (05 Mar 2025 06:07) (92% - 98%)    Parameters below as of 05 Mar 2025 06:07  Patient On (Oxygen Delivery Method): room air      CONSTITUTIONAL: Well groomed, no apparent distress  EYES: EOMI, No conjunctival or scleral injection, non-icteric  ENT: MMM. Normal dentition  RESP: No respiratory distress, no use of accessory muscles; CTA b/l, no WRR  CV: RRR, +S1S2, no MRG; no peripheral edema  GI: Soft, NT, ND, no rebound, no guarding; no palpable masses; no hepatosplenomegaly  : Shipley in place with ~1L yellow urine drained, no CVAT  NEURO: No focal deficits, motor strength and tone grossly intact  PSYCH: Appropriate insight/judgment; A&Ox3, mood and affect appropriate, recent/remote memory intact    I&O's Detail    04 Mar 2025 07:01  -  05 Mar 2025 07:00  --------------------------------------------------------  IN:  Total IN: 0 mL    OUT:    Voided (mL): 1000 mL  Total OUT: 1000 mL    Total NET: -1000 mL      LABS:                        11.5   10.01 )-----------( 237      ( 04 Mar 2025 15:53 )             34.9     03-04    149[H]  |  112[H]  |  84[H]  ----------------------------<  175[H]  4.6   |  23  |  6.55[H]    Ca    9.4      04 Mar 2025 22:00  Phos  5.1     03-04  Mg     2.9     03-04        Urinalysis Basic - ( 04 Mar 2025 22:00 )    Color: x / Appearance: x / SG: x / pH: x  Gluc: 175 mg/dL / Ketone: x  / Bili: x / Urobili: x   Blood: x / Protein: x / Nitrite: x   Leuk Esterase: x / RBC: x / WBC x   Sq Epi: x / Non Sq Epi: x / Bacteria: x      CAPILLARY BLOOD GLUCOSE        MEDICATIONS  (STANDING):  heparin   Injectable 5000 Unit(s) SubCutaneous every 8 hours  influenza   Vaccine 0.5 milliLiter(s) IntraMuscular once  sodium chloride 0.45%. 1000 milliLiter(s) (125 mL/Hr) IV Continuous <Continuous>  tamsulosin 0.4 milliGRAM(s) Oral at bedtime    MEDICATIONS  (PRN):

## 2025-03-05 NOTE — PROGRESS NOTE ADULT - SUBJECTIVE AND OBJECTIVE BOX
HPI:  HPI: Patient is a 65 yo male with no PMH who presented to the ED for urinary retention. He was told to come in after seeing his outpatient urologist earlier today who placed a ann catheter. Per patient, about 1L of urine was drained after placement of ann and another 1L of dark yellow was in the leg bag at time of presentation to the ED. He says that his urinary retention began after getting a sonogram (of the bladder? and testicles) on 2/23 for pain in that region. He states that right after the sonogram, he began experiencing significant/sharp pain in the area and noticed that he had dribbling of his urine. He was seen by his local doctor, who trialed flomax, but he only took one dose as he noticed he had some leaking of urine with this medication. This pain resolved after placement of the ann.     In the ED:  Initial vital signs: T: 97.9 F, HR: 72, BP: 129/69, R: 20, SpO2: 98% on RA  ED course:   Labs: significant for Hgb 11.5, Na 146, CO2 20, AG 20, , Cr 12.32; UA cloudy urine, 30 protein, trace ketone, large blood, 21 WBC, 303 RBC  Imaging: none  EKG: NSR, normal ECG,   Medications: ceftriaxone 1g x 1, 1L NS  Consults: none    (04 Mar 2025 17:48)   No NSAID use. Nephrology consulted for elevated creatinine.     PAST MEDICAL & SURGICAL HISTORY:  BPH (benign prostatic hyperplasia)      No significant past surgical history            Allergies:  No Known Allergies      Home Medications:       Hospital Medications:   MEDICATIONS  (STANDING):  cefTRIAXone   IVPB 1000 milliGRAM(s) IV Intermittent every 24 hours  dextrose 5%. 1800 milliLiter(s) (125 mL/Hr) IV Continuous <Continuous>  heparin   Injectable 5000 Unit(s) SubCutaneous every 8 hours  influenza   Vaccine 0.5 milliLiter(s) IntraMuscular once  tamsulosin 0.4 milliGRAM(s) Oral at bedtime      SOCIAL HISTORY:  Denies ETOh, Smoking,     Family History:  FAMILY HISTORY:  No pertinent family history in first degree relatives          VITALS:  T(F): 98.3 (03-05-25 @ 06:07), Max: 98.5 (03-04-25 @ 18:04)  HR: 75 (03-05-25 @ 06:07)  BP: 128/68 (03-05-25 @ 06:07)  RR: 19 (03-05-25 @ 06:07)  SpO2: 93% (03-05-25 @ 06:07)  Wt(kg): --    03-04 @ 07:01  -  03-05 @ 07:00  --------------------------------------------------------  IN: 0 mL / OUT: 1000 mL / NET: -1000 mL        Weight (kg): 83.9 (03-04 @ 15:28)  CAPILLARY BLOOD GLUCOSE          Review of Systems:  CONSTITUTIONAL: No fever or chills.  RESPIRATORY: No shortness of breath, cough  CARDIOVASCULAR: No Chest pain, shortness of breath, palpitations  GASTROINTESTINAL: No abdominal pain, nausea, vomiting, diarrhea  GENITOURINARY: No urinary frequency, gross hematuria, dysuria  NEUROLOGICAL: No headache, weakness  SKIN: No rash or skin lesion  MUSCULOSKELETAL: No swelling  Psych: Denies suicidal or homicidal ideation    PHYSICAL EXAM:  GENERAL: Alert, awake, oriented x3   HEENT: JS, EOMI, neck supple, no JVP  CHEST/LUNG: Bilateral clear breath sounds  HEART: Regular rate and rhythm, no murmur, no gallops, no rub   ABDOMEN: Soft, nontender, non distended  : No flank or supra pubic tenderness.  EXTREMITIES: no pedal edema  Neurology: AAOx3, no focal neurological deficit  SKIN: No rash or skin lesion   ACCESS: LUE AVF w/bruit and thrill     LABS:  03-05    153[H]  |  115[H]  |  52[H]  ----------------------------<  88  4.1   |  22  |  2.55[H]    Ca    9.0      05 Mar 2025 09:25  Phos  3.5     03-05  Mg     2.7     03-05      Creatinine Trend: 2.55 <--, 6.55 <--, 12.32 <--                        10.7   5.61  )-----------( 223      ( 05 Mar 2025 09:26 )             32.3     Urine Studies:  Urinalysis Basic - ( 05 Mar 2025 09:25 )    Color:  / Appearance:  / SG:  / pH:   Gluc: 88 mg/dL / Ketone:   / Bili:  / Urobili:    Blood:  / Protein:  / Nitrite:    Leuk Esterase:  / RBC:  / WBC    Sq Epi:  / Non Sq Epi:  / Bacteria:       Potassium, Random Urine: 30 mmol/L (03-05 @ 01:13)  Creatinine, Random Urine: 109 mg/dL (03-05 @ 01:13)  Protein/Creatinine Ratio Calculation: 0.3 Ratio (03-05 @ 01:13)  Sodium, Random Urine: 49 mmol/L (03-05 @ 01:13)        53M presented w/elevated BUN/Cr w/o known renal history. Nephrology consulted for elevated creatinine.     Assessment/Plan:   #non-oliguric SHORTY on CKD (pending r/o pre-renal vs post-renal)  #hyponatremia, hypovolemic   UOP 500cc/2h   UA w/protein and blood   uPCR pending   unclear baseline creatinine  etiology of SHORTY includes:   ischemic ATN unlikely as no hx of hypotensive epsiodes   toxic ATN unlikely as no known offending agents (gentamicin, chemotherapeutics)   contrast induced nephropathy unlikely as no known contrast exposure   rhabdomyolysis unlikely given no hx of trauma though pending CKMB panel   glomerulonephritis likely as UA w/protein and blood   AIN unlikely as no hx of PPI or antibiotics   paraproteinemia pending serum immunoelectropheresis (SPEP, immunofixation, free light chains)   renal infarct unlikely as rare   no NSAID use   most likely pre-renal, will pursue advanced work-up if initial work-up negative     Recommend:   STAT bladder scan r/o obstruction   continue with IVF NS @100cc/h   add-on CPK/CKMB panel  urine and CBC % eosinophils   urine protein-creatinine ratio   daily CXR   daily BMP + urine lytes   renal sono  strict I/Os   renal diet     Thank you for the opportunity to participate in the care of your patient. The nephrology service remains available to assist with any questions or concerns. Please feel free to reach us by paging the on-call nephrology fellow for urgent issues or as below.     Jami Ramos D.O.  PGY 4 - Nephrology Fellow  [phone] Patient is a 64y Male seen and evaluated at bedside.       Meds:    cefTRIAXone   IVPB 1000 every 24 hours  dextrose 5%. 1800 <Continuous>  heparin   Injectable 5000 every 8 hours  influenza   Vaccine 0.5 once  tamsulosin 0.4 at bedtime      T(C): , Max: 36.9 (03-04-25 @ 18:04)  T(F): , Max: 98.5 (03-04-25 @ 18:04)  HR: 75 (03-05-25 @ 06:07)  BP: 128/68 (03-05-25 @ 06:07)  BP(mean): --  RR: 19 (03-05-25 @ 06:07)  SpO2: 93% (03-05-25 @ 06:07)  Wt(kg): --    03-04 @ 07:01  -  03-05 @ 07:00  --------------------------------------------------------  IN: 0 mL / OUT: 1000 mL / NET: -1000 mL        Weight (kg): 83.9 (03-04 @ 15:28)    Review of Systems:  ROS negative except as per HPI      PHYSICAL EXAM:  GENERAL: NAD  NECK: supple, No JVD  CHEST/LUNG: Clear to auscultation bilaterally  HEART: normal S1S2, RRR  ABDOMEN: Soft, Nontender, +BS, No flank tenderness bilateral  EXTREMITIES: No clubbing, cyanosis, or edema   NEUROLOGY: AAO x3, no focal neurological deficit  ACCESS: good thrill and bruit appreciated      LABS:                        10.7   5.61  )-----------( 223      ( 05 Mar 2025 09:26 )             32.3     03-05    153[H]  |  115[H]  |  52[H]  ----------------------------<  88  4.1   |  22  |  2.55[H]    Ca    9.0      05 Mar 2025 09:25  Phos  3.5     03-05  Mg     2.7     03-05          Urinalysis Basic - ( 05 Mar 2025 09:25 )    Color: x / Appearance: x / SG: x / pH: x  Gluc: 88 mg/dL / Ketone: x  / Bili: x / Urobili: x   Blood: x / Protein: x / Nitrite: x   Leuk Esterase: x / RBC: x / WBC x   Sq Epi: x / Non Sq Epi: x / Bacteria: x      Potassium, Random Urine: 30 mmol/L (03-05 @ 01:13)  Creatinine, Random Urine: 109 mg/dL (03-05 @ 01:13)  Protein/Creatinine Ratio Calculation: 0.3 Ratio (03-05 @ 01:13)  Sodium, Random Urine: 49 mmol/L (03-05 @ 01:13)        RADIOLOGY & ADDITIONAL STUDIES:           Patient is a 64y Male seen and evaluated at bedside, found to be in the middle of prayer, per wife at bedside he feels fine today, no change in baseline mental status or orientation.      Meds:    cefTRIAXone   IVPB 1000 every 24 hours  dextrose 5%. 1800 <Continuous>  heparin   Injectable 5000 every 8 hours  influenza   Vaccine 0.5 once  tamsulosin 0.4 at bedtime      T(C): , Max: 36.9 (03-04-25 @ 18:04)  T(F): , Max: 98.5 (03-04-25 @ 18:04)  HR: 75 (03-05-25 @ 06:07)  BP: 128/68 (03-05-25 @ 06:07)  BP(mean): --  RR: 19 (03-05-25 @ 06:07)  SpO2: 93% (03-05-25 @ 06:07)  Wt(kg): --    03-04 @ 07:01  -  03-05 @ 07:00  --------------------------------------------------------  IN: 0 mL / OUT: 1000 mL / NET: -1000 mL        Weight (kg): 83.9 (03-04 @ 15:28)    Review of Systems:  ROS negative except as per HPI      PHYSICAL EXAM:  GENERAL: NAD  NECK: supple, No JVD  CHEST/LUNG: Clear to auscultation bilaterally  HEART: normal S1S2, RRR  ABDOMEN: Soft, Nontender, +BS, No flank tenderness bilateral  EXTREMITIES: No clubbing, cyanosis, or edema   NEUROLOGY: AAO x3, no focal neurological deficit  ACCESS: good thrill and bruit appreciated      LABS:                        10.7   5.61  )-----------( 223      ( 05 Mar 2025 09:26 )             32.3     03-05    153[H]  |  115[H]  |  52[H]  ----------------------------<  88  4.1   |  22  |  2.55[H]    Ca    9.0      05 Mar 2025 09:25  Phos  3.5     03-05  Mg     2.7     03-05          Urinalysis Basic - ( 05 Mar 2025 09:25 )    Color: x / Appearance: x / SG: x / pH: x  Gluc: 88 mg/dL / Ketone: x  / Bili: x / Urobili: x   Blood: x / Protein: x / Nitrite: x   Leuk Esterase: x / RBC: x / WBC x   Sq Epi: x / Non Sq Epi: x / Bacteria: x      Potassium, Random Urine: 30 mmol/L (03-05 @ 01:13)  Creatinine, Random Urine: 109 mg/dL (03-05 @ 01:13)  Protein/Creatinine Ratio Calculation: 0.3 Ratio (03-05 @ 01:13)  Sodium, Random Urine: 49 mmol/L (03-05 @ 01:13)        RADIOLOGY & ADDITIONAL STUDIES:

## 2025-03-05 NOTE — PROGRESS NOTE ADULT - SUBJECTIVE AND OBJECTIVE BOX
INTERVAL HPI/OVERNIGHT EVENTS:  No acute events overnight. Patient seen and examined bedside. Patient denies fevers/chills, HA/dizziness, nausea/vomiting, chest pain/shortness of breath, and abdominal/suprapubic/flank pain. Denies any problems with catheter.       Physical Exam MALE:   Gen: NAD  Abd: soft, nt, nd  : ann catheter in place, draining yellow clear, no CVAT

## 2025-03-05 NOTE — PROGRESS NOTE ADULT - PROBLEM SELECTOR PLAN 6
Normocytic anemia  No s/s active bleeding  - AM iron studies  - active T&S Normocytic anemia  No s/s or active bleeding  - f/u iron studies  - active T&S

## 2025-03-05 NOTE — PROGRESS NOTE ADULT - PROBLEM SELECTOR PLAN 7
F: maintenance  cc/hr  E: replete aggressively  N: renal diet  GI: none  DVT: heparin  Dispo: [facility]

## 2025-03-05 NOTE — PROGRESS NOTE ADULT - PROBLEM SELECTOR PLAN 1
Patient presenting with Cr 12.32, unknown baseline but favor in setting of post obstructive SHORTY  Will need monitoring for post-obstructive diuresis  - trend BMP with aggressive electrolyte repletion  - strict I/O's  - nephrology following, f/u recs - f/u urine lytes, retroperitoneal US  - urology following, s/p outpatient Shipley placement 3/4  - maintenance fluids 125 cc/hr; will titrate based on output Patient presenting with Cr 12.32, unknown baseline but favor in setting of post obstructive SHORTY  Will need monitoring for post-obstructive diuresis  - RBUS ordered  - trend BMP with aggressive electrolyte repletion  - strict I/O's  - nephrology following with following recs - CTM urine lytes (s/p lokelma 10g in ED)  - urology following, s/p outpatient Shipley placement 3/4, recs as follows: f/u UCx  - maintenance fluids 125 cc/hr; will replete volume per urology recs: 0.5:1

## 2025-03-05 NOTE — PROGRESS NOTE ADULT - PROBLEM SELECTOR PLAN 4
Favor in setting of uremia from SHORTY  - continue to monitor Favor in setting of uremia from SHORTY  - resolved (A), will continue to monitor

## 2025-03-05 NOTE — PROGRESS NOTE ADULT - PROBLEM SELECTOR PLAN 5
UA showing 21 WBC but no bacteria  Patient denying any urinary symptoms  s/p 1g CTX in ED  - monitor off abx UA showing 21 WBC but no bacteria  Patient denying any urinary symptoms  s/p 1g CTX in ED  - continuing CTX until UCx results

## 2025-03-05 NOTE — PROGRESS NOTE ADULT - ASSESSMENT
Latrice 64M admitted to medicine 2/2 urinary retention concern for post-obstructive diuresis. Catheter placed with >1.7L UOP, now sent to the ED. Reports past few weeks of poor urinary stream, dribbling, no infectious signs or symptoms, no nausea/vomiting. Had bilateral flank pain and suprapubic pain now improved. Afebrile, vital signs stable in ED. Exam unremarkable for b/l CVAT or SP pain, some penile pain with catheter and will get lidocaine gel. Urine clear yellow, UA moderate LE, neg nitrite, 21 WBC, and was started on CTX by ED, UCx sent and pending. WBC 10.01, hgb 11.5, Cr 12.32 (do not have baseline available, wife said they had paperwork but unsure where now). Remainder of chem panel with K 5.3, Na 146, phos 7.4, pending Mg. Nephrology also consulted and will provide recs    3/5: No acute events overnight. Doing well this morning with no fever/chills, nausea/vomiting, suprapubic or flank pain. Overnight creatinine downtrended from 12 to 6. Remainder of chem panel stable from yesterday, still with elevated Na, Phos, Mg, currently on 1/2NS for mIVF and renal diet. Pending AM labs. 1L UOP clear yellow via ann overnight. Exam unremarkable for suprapubic tenderness or CVAT bilaterally. Started on flomax daily. Scheduled for RBUS today. Will continue to follow.    -Maintain ann for strict I/Os   -Trend BMP daily  -RBUS  -Replete volume PRN. Replete lytes PRN  -Nightly flomax  -f/u UA/UCx  -OOBA/PT if needed  -Bowel regimen/minimize opiates/anti-cholinergics  -will follow care per medicine and nephrology   Latrice 64M admitted to medicine 2/2 urinary retention concern for post-obstructive diuresis. Catheter placed with >1.7L UOP, now sent to the ED. Reports past few weeks of poor urinary stream, dribbling, no infectious signs or symptoms, no nausea/vomiting. Had bilateral flank pain and suprapubic pain now improved. Afebrile, vital signs stable in ED. Exam unremarkable for b/l CVAT or SP pain, some penile pain with catheter and will get lidocaine gel. Urine clear yellow, UA moderate LE, neg nitrite, 21 WBC, and was started on CTX by ED, UCx sent and pending. WBC 10.01, hgb 11.5, Cr 12.32 (do not have baseline available, wife said they had paperwork but unsure where now). Remainder of chem panel with K 5.3, Na 146, phos 7.4, pending Mg. Nephrology also consulted and will provide recs    3/5: No acute events overnight. Doing well this morning with no fever/chills, nausea/vomiting, suprapubic or flank pain. Overnight creatinine downtrended from 12 to 6. Remainder of chem panel stable from yesterday, still with elevated Na, Phos, Mg, currently on 1/2NS for mIVF and renal diet. Pending AM labs. 1L UOP clear yellow via ann overnight. Exam unremarkable for suprapubic tenderness or CVAT bilaterally. Started on flomax daily. Scheduled for RBUS today. Will continue to follow.    -Maintain ann for strict I/Os   -Trend BMP daily  -RBUS  -Replete volume PRN. Replete lytes PRN  -Nightly flomax  -f/u UA/UCx  -OOBA/PT if needed  -Bowel regimen/minimize opiates/anti-cholinergics  -will follow care per medicine and nephrology  -patient to be discharged with ann, and will f/u with Dr. Marie's office to discuss surgical management of BPH

## 2025-03-05 NOTE — PROGRESS NOTE ADULT - ASSESSMENT
Patient is a 65 yo male with no PMH who presented to the ED for urinary retention, found to have SHORTY likely post-obstructive in nature.

## 2025-03-06 ENCOUNTER — TRANSCRIPTION ENCOUNTER (OUTPATIENT)
Age: 65
End: 2025-03-06

## 2025-03-06 VITALS
TEMPERATURE: 98 F | SYSTOLIC BLOOD PRESSURE: 145 MMHG | OXYGEN SATURATION: 95 % | HEART RATE: 60 BPM | DIASTOLIC BLOOD PRESSURE: 78 MMHG | RESPIRATION RATE: 18 BRPM

## 2025-03-06 PROBLEM — N40.0 BENIGN PROSTATIC HYPERPLASIA WITHOUT LOWER URINARY TRACT SYMPTOMS: Chronic | Status: ACTIVE | Noted: 2025-03-04

## 2025-03-06 LAB
ANION GAP SERPL CALC-SCNC: 9 MMOL/L — SIGNIFICANT CHANGE UP (ref 5–17)
BASOPHILS # BLD AUTO: 0.03 K/UL — SIGNIFICANT CHANGE UP (ref 0–0.2)
BASOPHILS NFR BLD AUTO: 0.6 % — SIGNIFICANT CHANGE UP (ref 0–2)
BUN SERPL-MCNC: 21 MG/DL — SIGNIFICANT CHANGE UP (ref 7–23)
CALCIUM SERPL-MCNC: 8.6 MG/DL — SIGNIFICANT CHANGE UP (ref 8.4–10.5)
CHLORIDE SERPL-SCNC: 111 MMOL/L — HIGH (ref 96–108)
CO2 SERPL-SCNC: 25 MMOL/L — SIGNIFICANT CHANGE UP (ref 22–31)
CREAT SERPL-MCNC: 1.02 MG/DL — SIGNIFICANT CHANGE UP (ref 0.5–1.3)
EGFR: 82 ML/MIN/1.73M2 — SIGNIFICANT CHANGE UP
EGFR: 82 ML/MIN/1.73M2 — SIGNIFICANT CHANGE UP
EOSINOPHIL # BLD AUTO: 0.17 K/UL — SIGNIFICANT CHANGE UP (ref 0–0.5)
EOSINOPHIL NFR BLD AUTO: 3.4 % — SIGNIFICANT CHANGE UP (ref 0–6)
GLUCOSE SERPL-MCNC: 122 MG/DL — HIGH (ref 70–99)
HCT VFR BLD CALC: 31 % — LOW (ref 39–50)
HGB BLD-MCNC: 9.9 G/DL — LOW (ref 13–17)
IMM GRANULOCYTES NFR BLD AUTO: 0.2 % — SIGNIFICANT CHANGE UP (ref 0–0.9)
LYMPHOCYTES # BLD AUTO: 1.39 K/UL — SIGNIFICANT CHANGE UP (ref 1–3.3)
LYMPHOCYTES # BLD AUTO: 28 % — SIGNIFICANT CHANGE UP (ref 13–44)
MAGNESIUM SERPL-MCNC: 2.2 MG/DL — SIGNIFICANT CHANGE UP (ref 1.6–2.6)
MCHC RBC-ENTMCNC: 30.6 PG — SIGNIFICANT CHANGE UP (ref 27–34)
MCHC RBC-ENTMCNC: 31.9 G/DL — LOW (ref 32–36)
MCV RBC AUTO: 95.7 FL — SIGNIFICANT CHANGE UP (ref 80–100)
MONOCYTES # BLD AUTO: 0.99 K/UL — HIGH (ref 0–0.9)
MONOCYTES NFR BLD AUTO: 20 % — HIGH (ref 2–14)
NEUTROPHILS # BLD AUTO: 2.37 K/UL — SIGNIFICANT CHANGE UP (ref 1.8–7.4)
NEUTROPHILS NFR BLD AUTO: 47.8 % — SIGNIFICANT CHANGE UP (ref 43–77)
NRBC BLD AUTO-RTO: 0 /100 WBCS — SIGNIFICANT CHANGE UP (ref 0–0)
PHOSPHATE SERPL-MCNC: 3.7 MG/DL — SIGNIFICANT CHANGE UP (ref 2.5–4.5)
PLATELET # BLD AUTO: 224 K/UL — SIGNIFICANT CHANGE UP (ref 150–400)
POTASSIUM SERPL-MCNC: 3.8 MMOL/L — SIGNIFICANT CHANGE UP (ref 3.5–5.3)
POTASSIUM SERPL-SCNC: 3.8 MMOL/L — SIGNIFICANT CHANGE UP (ref 3.5–5.3)
RBC # BLD: 3.24 M/UL — LOW (ref 4.2–5.8)
RBC # FLD: 14.5 % — SIGNIFICANT CHANGE UP (ref 10.3–14.5)
SODIUM SERPL-SCNC: 145 MMOL/L — SIGNIFICANT CHANGE UP (ref 135–145)
WBC # BLD: 4.96 K/UL — SIGNIFICANT CHANGE UP (ref 3.8–10.5)
WBC # FLD AUTO: 4.96 K/UL — SIGNIFICANT CHANGE UP (ref 3.8–10.5)

## 2025-03-06 PROCEDURE — 84156 ASSAY OF PROTEIN URINE: CPT

## 2025-03-06 PROCEDURE — 82728 ASSAY OF FERRITIN: CPT

## 2025-03-06 PROCEDURE — 84540 ASSAY OF URINE/UREA-N: CPT

## 2025-03-06 PROCEDURE — 84133 ASSAY OF URINE POTASSIUM: CPT

## 2025-03-06 PROCEDURE — 83550 IRON BINDING TEST: CPT

## 2025-03-06 PROCEDURE — 76770 US EXAM ABDO BACK WALL COMP: CPT

## 2025-03-06 PROCEDURE — 93005 ELECTROCARDIOGRAM TRACING: CPT

## 2025-03-06 PROCEDURE — 83735 ASSAY OF MAGNESIUM: CPT

## 2025-03-06 PROCEDURE — 71045 X-RAY EXAM CHEST 1 VIEW: CPT

## 2025-03-06 PROCEDURE — 36415 COLL VENOUS BLD VENIPUNCTURE: CPT

## 2025-03-06 PROCEDURE — 86850 RBC ANTIBODY SCREEN: CPT

## 2025-03-06 PROCEDURE — 81001 URINALYSIS AUTO W/SCOPE: CPT

## 2025-03-06 PROCEDURE — 84300 ASSAY OF URINE SODIUM: CPT

## 2025-03-06 PROCEDURE — 82962 GLUCOSE BLOOD TEST: CPT

## 2025-03-06 PROCEDURE — 87086 URINE CULTURE/COLONY COUNT: CPT

## 2025-03-06 PROCEDURE — 82570 ASSAY OF URINE CREATININE: CPT

## 2025-03-06 PROCEDURE — 85025 COMPLETE CBC W/AUTO DIFF WBC: CPT

## 2025-03-06 PROCEDURE — 80048 BASIC METABOLIC PNL TOTAL CA: CPT

## 2025-03-06 PROCEDURE — 84100 ASSAY OF PHOSPHORUS: CPT

## 2025-03-06 PROCEDURE — 99285 EMERGENCY DEPT VISIT HI MDM: CPT

## 2025-03-06 PROCEDURE — 99239 HOSP IP/OBS DSCHRG MGMT >30: CPT

## 2025-03-06 PROCEDURE — 86900 BLOOD TYPING SEROLOGIC ABO: CPT

## 2025-03-06 PROCEDURE — 82610 CYSTATIN C: CPT

## 2025-03-06 PROCEDURE — 83540 ASSAY OF IRON: CPT

## 2025-03-06 PROCEDURE — 86901 BLOOD TYPING SEROLOGIC RH(D): CPT

## 2025-03-06 PROCEDURE — 99222 1ST HOSP IP/OBS MODERATE 55: CPT | Mod: GC

## 2025-03-06 RX ORDER — TAMSULOSIN HYDROCHLORIDE 0.4 MG/1
1 CAPSULE ORAL
Qty: 30 | Refills: 1
Start: 2025-03-06

## 2025-03-06 RX ADMIN — Medication 20 MILLIEQUIVALENT(S): at 12:46

## 2025-03-06 RX ADMIN — POTASSIUM PHOSPHATE, MONOBASIC POTASSIUM PHOSPHATE, DIBASIC INJECTION, 83.33 MILLIMOLE(S): 236; 224 SOLUTION, CONCENTRATE INTRAVENOUS at 01:44

## 2025-03-06 NOTE — DISCHARGE NOTE PROVIDER - NSDCHHBASESERVICE_GEN_ALL_CORE
Hi Dr. Petersen, I think the periactin must be an old med since it hasn't been rx'ed for her since June 2018. We did an EKG on her which was normal this past November but we can try to get her in if she's having any issues with this more recently. Thank you for reaching out.    Nursing

## 2025-03-06 NOTE — DISCHARGE NOTE PROVIDER - CARE PROVIDER_API CALL
Miracle Marie.  Urology  [address]  [place] [address]  Phone: [phone]  Fax: [phone]  Follow Up Time: 1 week   Miracle Marie.  Urology  [address]  [place] [address]  Phone: [phone]  Fax: [phone]  Scheduled Appointment: 03/07/2025 11:30 AM

## 2025-03-06 NOTE — PROGRESS NOTE ADULT - ATTENDING COMMENTS
Consult note reviewed/confirmed.    I: SHORTY with SCr 12 with hyperkalemia, Cr now 1.02.   A: SHORTY due to post-obstructive diuresis, now improved.   P: Follow up with urology.
63 yo M with PMHx ?BPH px from outpatient urology office s/p Ann placement i/s/o urinary retention with 1L UOP, admitted for further monitoring of AGMA with SHORTY and post-obstructive diuresis.      Plan:   Continue with ann catheter, Urology following; will d/c with catheter. Continue tamsulosin. Strict Is and Os   Continue D5 for hypernatremia and post obstructive diuresis; continue to follow BMP q8  Cr trending down nicely. Renal US w/ Pelvic fullness bilaterally suggestive of mild bilateral hydronephrosis. Enlarged prostate. As expected w/ weeks of urinary retention.   Renal, Urology recs appreciated

## 2025-03-06 NOTE — DISCHARGE NOTE PROVIDER - ATTENDING DISCHARGE PHYSICAL EXAMINATION:
Physical exam at the time of discharge:   CONSTITUTIONAL: Well groomed, no apparent distress  EYES: EOMI, No conjunctival or scleral injection, non-icteric  ENT: MMM. Normal dentition  RESP: No respiratory distress, no use of accessory muscles; CTA b/l, no WRR  CV: RRR, +S1S2, no MRG; no peripheral edema  GI: Soft, NT, ND, no rebound, no guarding; no palpable masses; no hepatosplenomegaly  : Shipley in place with ~1L yellow urine drained, no CVAT  NEURO: No focal deficits, motor strength and tone grossly intact  PSYCH: Appropriate insight/judgment; A&Ox3, mood and affect appropriate, recent/remote memory intact

## 2025-03-06 NOTE — PROGRESS NOTE ADULT - ASSESSMENT
Latrice 64M admitted to medicine 2/2 urinary retention concern for post-obstructive diuresis. Catheter placed with >1.7L UOP, now sent to the ED. Reports past few weeks of poor urinary stream, dribbling, no infectious signs or symptoms, no nausea/vomiting. Had bilateral flank pain and suprapubic pain now improved. Afebrile, vital signs stable in ED. Exam unremarkable for b/l CVAT or SP pain, some penile pain with catheter and will get lidocaine gel. Urine clear yellow, UA moderate LE, neg nitrite, 21 WBC, and was started on CTX by ED, UCx sent and pending. WBC 10.01, hgb 11.5, Cr 12.32 (do not have baseline available, wife said they had paperwork but unsure where now). Remainder of chem panel with K 5.3, Na 146, phos 7.4, pending Mg. Nephrology also consulted and will provide recs    3/5: No acute events overnight. Doing well this morning with no fever/chills, nausea/vomiting, suprapubic or flank pain. Medicine team repleting Phos (2.2, 2.3) and providing D5W for uptrending hypernatremia (153 > 148). AVSS, CYU per ann 1500/3650cc. Cr downtrended (12 > 6 >2.5 > 1.3 overnight). UCx no growth. RBUS performed, showed mild pelvic fullness of bilateral kidneys, prostate size measured at 144cc. Discussed w/ Mr. Pollard patient will be dcd home with ann (pending lyte normalization), and to f/u in Dr. Marei's office to discuss surgical management    -Maintain ann for strict I/Os   -Trend BMP  -Replete volume PRN. Replete lytes PRN  -Nightly flomax  -OOBA/PT if needed  -Bowel regimen/minimize opiates/anti-cholinergics  -will follow care per medicine and nephrology  -patient to be discharged with ann, and will f/u with Dr. Marie's office to discuss surgical management of BPH

## 2025-03-06 NOTE — PROGRESS NOTE ADULT - REASON FOR ADMISSION
Post obstructive SHORTY/diuresis

## 2025-03-06 NOTE — DISCHARGE NOTE NURSING/CASE MANAGEMENT/SOCIAL WORK - FINANCIAL ASSISTANCE
[facility] provides services at a reduced cost to those who are determined to be eligible through [facility]’s financial assistance program. Information regarding [facility]’s financial assistance program can be found by going to [email or web address] or by calling [phone].

## 2025-03-06 NOTE — DISCHARGE NOTE PROVIDER - HOSPITAL COURSE
#Discharge: do not delete    SANDI BETANCOURT is a 65 yo male with no PMH who presented to the ED for urinary retention. He was told to come in after seeing his outpatient urologist who placed a ann catheter. Per patient, about 1L of urine was drained after placement of ann and another 1L of dark yellow was in the leg bag at time of presentation to the ED. He says that his urinary retention began after getting a sonogram (of the bladder? and testicles) on 2/23 for pain in that region. He states that right after the sonogram, he began experiencing significant/sharp pain in the area and noticed that he had dribbling of his urine. He was seen by his local doctor, who trialed flomax, but he only took one dose as he noticed he had some leaking of urine with this medication. This pain resolved after placement of the ann.     Problem List/Main Diagnoses (system-based):   # SHORTY (acute kidney injury).   ·  Plan: Patient presenting with Cr 12.32, unknown baseline but favor in setting of post obstructive AK. Monitored for POD. RP ultrasound shows b/l mild hydronephrosis. SHORTY resolved. Pt will be discharged with ann and nightly flomax. F/u outpatient urology.   - urology following, s/p outpatient Ann placement 3/4, recs as follows: f/u UCx  - maintenance fluids 125 cc/hr; will replete volume per urology recs: 0.5:1.    #     #    Patient was discharged to: (home/[facility]/acute rehab/hospice, etc. and w/ home health/home PT/RN/home O2)    New medications:   Changes to old medications:  Medications that were stopped:    Items to follow up as outpatient:    Physical exam at the time of discharge:       LABS & STUDIES:   #Discharge: do not delete    SANDI BETANCOURT is a 65 yo male with no PMH who presented to the ED for urinary retention. He was told to come in after seeing his outpatient urologist who placed a ann catheter. Per patient, about 1L of urine was drained after placement of ann and another 1L of dark yellow was in the leg bag at time of presentation to the ED. He says that his urinary retention began after getting a sonogram (of the bladder? and testicles) on 2/23 for pain in that region. He states that right after the sonogram, he began experiencing significant/sharp pain in the area and noticed that he had dribbling of his urine. He was seen by his local doctor, who trialed flomax, but he only took one dose as he noticed he had some leaking of urine with this medication. This pain resolved after placement of the ann.     Problem List/Main Diagnoses (system-based):   # SHORTY (acute kidney injury).   ·  Plan: Patient presenting with Cr 12.32, unknown baseline but favor in setting of post obstructive AK. Monitored for POD. RP ultrasound shows b/l mild hydronephrosis. SHORTY resolved. Pt will be discharged with ann and nightly flomax. F/u outpatient urology.   - urology following, s/p outpatient Ann placement 3/4, ann replaced on 03/06 prior to discharge. recs as follows: f/u UCx  - maintenance fluids 125 cc/hr; will replete volume per urology recs: 0.5:1.      Patient was discharged to: home    New medications: Tamsulosin 0.4mg at night daily   Changes to old medications: none  Medications that were stopped: none    Items to follow up as outpatient:    F/u with urology Dr. Marie within 1 week of discharge.     Physical exam at the time of discharge:   CONSTITUTIONAL: Well groomed, no apparent distress  EYES: EOMI, No conjunctival or scleral injection, non-icteric  ENT: MMM. Normal dentition  RESP: No respiratory distress, no use of accessory muscles; CTA b/l, no WRR  CV: RRR, +S1S2, no MRG; no peripheral edema  GI: Soft, NT, ND, no rebound, no guarding; no palpable masses; no hepatosplenomegaly  : Ann in place with ~1L yellow urine drained, no CVAT  NEURO: No focal deficits, motor strength and tone grossly intact  PSYCH: Appropriate insight/judgment; A&Ox3, mood and affect appropriate, recent/remote memory intact    LABS & STUDIES:  .  LABS:                         9.9    4.96  )-----------( 224      ( 06 Mar 2025 07:59 )             31.0     03-06    145  |  111[H]  |  21  ----------------------------<  122[H]  3.8   |  25  |  1.02    Ca    8.6      06 Mar 2025 07:59  Phos  3.7     03-06  Mg     2.2     03-06        Urinalysis Basic - ( 06 Mar 2025 07:59 )    Color: x / Appearance: x / SG: x / pH: x  Gluc: 122 mg/dL / Ketone: x  / Bili: x / Urobili: x   Blood: x / Protein: x / Nitrite: x   Leuk Esterase: x / RBC: x / WBC x   Sq Epi: x / Non Sq Epi: x / Bacteria: x                RADIOLOGY, EKG & ADDITIONAL TESTS: Reviewed.

## 2025-03-06 NOTE — PROGRESS NOTE ADULT - SUBJECTIVE AND OBJECTIVE BOX
Patient is a 64y Male seen and evaluated at bedside. Patient feels OK today, no acute complaints.       Meds:    heparin   Injectable 5000 every 8 hours  influenza   Vaccine 0.5 once  potassium chloride    Tablet ER 20 once  tamsulosin 0.4 at bedtime      T(C): , Max: 37.1 (03-06-25 @ 05:49)  T(F): , Max: 98.8 (03-06-25 @ 05:49)  HR: 60 (03-06-25 @ 11:03)  BP: 145/78 (03-06-25 @ 11:03)  BP(mean): 93 (03-05-25 @ 21:55)  RR: 18 (03-06-25 @ 11:03)  SpO2: 95% (03-06-25 @ 11:03)  Wt(kg): --    03-05 @ 07:01  -  03-06 @ 07:00  --------------------------------------------------------  IN: 880 mL / OUT: 3650 mL / NET: -2770 mL    03-06 @ 07:01  -  03-06 @ 11:57  --------------------------------------------------------  IN: 0 mL / OUT: 450 mL / NET: -450 mL          Review of Systems:  ROS negative except as per HPI      PHYSICAL EXAM:  GENERAL: NAD  NECK: supple, No JVD  CHEST/LUNG: Clear to auscultation bilaterally  HEART: normal S1S2, RRR  ABDOMEN: Soft, Nontender, +BS, No flank tenderness bilateral  EXTREMITIES: No clubbing, cyanosis, or edema   NEUROLOGY: AAO x3, no focal neurological deficit  ACCESS: good thrill and bruit appreciated      LABS:                        9.9    4.96  )-----------( 224      ( 06 Mar 2025 07:59 )             31.0     03-06    145  |  111[H]  |  21  ----------------------------<  122[H]  3.8   |  25  |  1.02    Ca    8.6      06 Mar 2025 07:59  Phos  3.7     03-06  Mg     2.2     03-06          Urinalysis Basic - ( 06 Mar 2025 07:59 )    Color: x / Appearance: x / SG: x / pH: x  Gluc: 122 mg/dL / Ketone: x  / Bili: x / Urobili: x   Blood: x / Protein: x / Nitrite: x   Leuk Esterase: x / RBC: x / WBC x   Sq Epi: x / Non Sq Epi: x / Bacteria: x      Potassium, Random Urine: 30 mmol/L (03-05 @ 01:13)  Creatinine, Random Urine: 109 mg/dL (03-05 @ 01:13)  Protein/Creatinine Ratio Calculation: 0.3 Ratio (03-05 @ 01:13)  Sodium, Random Urine: 49 mmol/L (03-05 @ 01:13)        RADIOLOGY & ADDITIONAL STUDIES:

## 2025-03-06 NOTE — PROGRESS NOTE ADULT - ASSESSMENT
65 yo male with  no known medical illnesses  who presented to the ED 3/4   with post obstructive uropathy with rising creat 12.3 , , nephrology consulted for SHORTY .     #Post-obstructive non-oliguric  SHORTY:   Ann cath placed by OP urologist draining ~2L of urine between time of placement and arrival to ED   Labs in ED c/f  , Creat 12.3  SHORTY at this time likely ISO obstruction, however extended period of obstruction can lead to intrinsic injury  Given rapid decrease in creatinine (12.32>6.55>2.55) most likely post-obstructive  UA cloudy w/ RBCs and WBCs c/w post-obstructive SHORTY   UProtein/Cr ratio 0.3  3/5 renal US also c/w mild bilateral hydronephrosis     Creatinine normalized today, SHORTY was likely post-obstructive     Plan  -Maintain ann cath  -Strict I/o chart  -Avoid nephrotoxic agents  -renally adjust meds for GFR <30  -No acute indication for HD    #Mild hypernatremia:   Resolved    Nephrology will sign off at this time.      Recommendations pending attending attestation. 65 yo male with  no known medical illnesses  who presented to the ED 3/4   with post obstructive uropathy with rising creat 12.3 , , nephrology consulted for SHORTY .     #Post-obstructive non-oliguric  SHORTY:   Shipley cath placed by OP urologist draining ~2L of urine between time of placement and arrival to ED   Labs in ED c/f  , Creat 12.3  SHORTY at this time likely ISO obstruction, however extended period of obstruction can lead to intrinsic injury  Given rapid decrease in creatinine (12.32>6.55>2.55) most likely post-obstructive  UA cloudy w/ RBCs and WBCs c/w post-obstructive SHORTY   UProtein/Cr ratio 0.3  3/5 renal US also c/w mild bilateral hydronephrosis     Creatinine normalized today, SHORTY was likely post-obstructive     #Mild hypernatremia:   Resolved    Nephrology will sign off at this time.       Recommendations pending attending attestation.

## 2025-03-06 NOTE — DISCHARGE NOTE PROVIDER - NSDCFUSCHEDAPPT_GEN_ALL_CORE_FT
Miracle Marie  [facility] Physician [facility]  UROLOGY [address]  Scheduled Appointment: 03/07/2025

## 2025-03-06 NOTE — DISCHARGE NOTE NURSING/CASE MANAGEMENT/SOCIAL WORK - PATIENT PORTAL LINK FT
You can access the FollowMyHealth Patient Portal offered by [facility] by registering at the following website: [email or web address]. By joining [employer]’s FollowMyHealth portal, you will also be able to view your health information using other applications (apps) compatible with our system.

## 2025-03-06 NOTE — DISCHARGE NOTE PROVIDER - NSDCCPCAREPLAN_GEN_ALL_CORE_FT
PRINCIPAL DISCHARGE DIAGNOSIS  Diagnosis: Acute renal failure  Assessment and Plan of Treatment:      PRINCIPAL DISCHARGE DIAGNOSIS  Diagnosis: Acute renal failure  Assessment and Plan of Treatment: You were found to have a kidney injury likely from your enlarged prostate blocking exit of your urine from the ureter. You were treated with fluids, a ann catheter, and started on tamsulosin to help with urinary flow. You are being discharged with a ann catheter and tamsulosin 0.4mg (1 tablet) each night. Please follow up with Dr. Marie within 1 week in urology to discuss surgical evaluation to reduce the size of your prostate and allow better urine outflow.     PRINCIPAL DISCHARGE DIAGNOSIS  Diagnosis: Acute renal failure  Assessment and Plan of Treatment: You were found to have a kidney injury likely from your enlarged prostate blocking exit of your urine from the ureter. You were treated with fluids, a ann catheter (placed 03/06), and started on tamsulosin to help with urinary flow. You are being discharged with a ann catheter and tamsulosin 0.4mg (1 tablet) each night. Please follow up with Dr. Marie within 1 week in urology to discuss surgical evaluation to reduce the size of your prostate and allow better urine outflow.     PRINCIPAL DISCHARGE DIAGNOSIS  Diagnosis: Acute renal failure  Assessment and Plan of Treatment: You were found to have a kidney injury likely from your enlarged prostate blocking exit of your urine from the ureter. You were treated with fluids, a ann catheter (placed 03/06), and started on tamsulosin to help with urinary flow. You are being discharged with a ann catheter and tamsulosin 0.4mg (1 tablet) each night. Please follow up with Dr. Marie within 1 week in urology to discuss surgical evaluation to reduce the size of your prostate and allow better urine outflow.  Ann care instructions:  1. Clean your catheter every day.  2. Change your drainage bags 2 times a day.  3. Replace your drainage bags with new bags once a week.  4. Wash your drainage bags every day.  5. Drink 1 to 2 glasses of liquids every 2 hours while you’re awake to keep you hydrated.

## 2025-03-06 NOTE — DISCHARGE NOTE PROVIDER - PROVIDER TOKENS
PROVIDER:[TOKEN:[[id number]],FOLLOWUP:[1 week]] PROVIDER:[TOKEN:[[id number]],SCHEDULEDAPPT:[03/07/2025],SCHEDULEDAPPTTIME:[11:30 AM]]

## 2025-03-07 ENCOUNTER — APPOINTMENT (OUTPATIENT)
Dept: UROLOGY | Facility: CLINIC | Age: 65
End: 2025-03-07
Payer: COMMERCIAL

## 2025-03-07 ENCOUNTER — NON-APPOINTMENT (OUTPATIENT)
Age: 65
End: 2025-03-07

## 2025-03-07 VITALS
TEMPERATURE: 97 F | DIASTOLIC BLOOD PRESSURE: 72 MMHG | HEART RATE: 71 BPM | BODY MASS INDEX: 28.04 KG/M2 | HEIGHT: 68 IN | WEIGHT: 185 LBS | SYSTOLIC BLOOD PRESSURE: 132 MMHG

## 2025-03-07 PROCEDURE — 99214 OFFICE O/P EST MOD 30 MIN: CPT

## 2025-03-07 PROCEDURE — 76872 US TRANSRECTAL: CPT

## 2025-03-07 PROCEDURE — G2211 COMPLEX E/M VISIT ADD ON: CPT

## 2025-03-11 PROBLEM — R33.9 URINARY RETENTION: Status: ACTIVE | Noted: 2025-03-04

## 2025-03-11 RX ORDER — LIDOCAINE HYDROCHLORIDE 20 MG/ML
2 JELLY TOPICAL
Qty: 1 | Refills: 3 | Status: ACTIVE | COMMUNITY
Start: 2025-03-11 | End: 1900-01-01

## 2025-03-11 RX ORDER — OXYBUTYNIN CHLORIDE 10 MG/1
10 TABLET, EXTENDED RELEASE ORAL
Qty: 30 | Refills: 3 | Status: ACTIVE | COMMUNITY
Start: 2025-03-11 | End: 1900-01-01

## 2025-03-13 DIAGNOSIS — E87.0 HYPEROSMOLALITY AND HYPERNATREMIA: ICD-10-CM

## 2025-03-13 DIAGNOSIS — N40.1 BENIGN PROSTATIC HYPERPLASIA WITH LOWER URINARY TRACT SYMPTOMS: ICD-10-CM

## 2025-03-13 DIAGNOSIS — N13.30 UNSPECIFIED HYDRONEPHROSIS: ICD-10-CM

## 2025-03-13 DIAGNOSIS — E87.5 HYPERKALEMIA: ICD-10-CM

## 2025-03-13 DIAGNOSIS — D64.9 ANEMIA, UNSPECIFIED: ICD-10-CM

## 2025-03-13 DIAGNOSIS — R82.81 PYURIA: ICD-10-CM

## 2025-03-13 DIAGNOSIS — E87.20 ACIDOSIS, UNSPECIFIED: ICD-10-CM

## 2025-03-13 DIAGNOSIS — R33.9 RETENTION OF URINE, UNSPECIFIED: ICD-10-CM

## 2025-03-13 DIAGNOSIS — N17.9 ACUTE KIDNEY FAILURE, UNSPECIFIED: ICD-10-CM

## 2025-03-13 DIAGNOSIS — R33.8 OTHER RETENTION OF URINE: ICD-10-CM

## 2025-03-19 ENCOUNTER — NON-APPOINTMENT (OUTPATIENT)
Age: 65
End: 2025-03-19

## 2025-03-19 ENCOUNTER — APPOINTMENT (OUTPATIENT)
Dept: UROLOGY | Facility: CLINIC | Age: 65
End: 2025-03-19
Payer: COMMERCIAL

## 2025-03-19 VITALS
DIASTOLIC BLOOD PRESSURE: 70 MMHG | WEIGHT: 185 LBS | HEIGHT: 68 IN | BODY MASS INDEX: 28.04 KG/M2 | TEMPERATURE: 98.2 F | SYSTOLIC BLOOD PRESSURE: 117 MMHG | HEART RATE: 71 BPM

## 2025-03-19 PROCEDURE — G2211 COMPLEX E/M VISIT ADD ON: CPT

## 2025-03-19 PROCEDURE — 99215 OFFICE O/P EST HI 40 MIN: CPT

## 2025-03-26 ENCOUNTER — APPOINTMENT (OUTPATIENT)
Dept: UROLOGY | Facility: CLINIC | Age: 65
End: 2025-03-26
Payer: COMMERCIAL

## 2025-03-26 ENCOUNTER — NON-APPOINTMENT (OUTPATIENT)
Age: 65
End: 2025-03-26

## 2025-03-26 VITALS
TEMPERATURE: 98 F | WEIGHT: 185 LBS | SYSTOLIC BLOOD PRESSURE: 108 MMHG | HEIGHT: 68 IN | BODY MASS INDEX: 28.04 KG/M2 | HEART RATE: 59 BPM | DIASTOLIC BLOOD PRESSURE: 67 MMHG

## 2025-03-26 DIAGNOSIS — R33.9 RETENTION OF URINE, UNSPECIFIED: ICD-10-CM

## 2025-03-26 PROCEDURE — 99214 OFFICE O/P EST MOD 30 MIN: CPT

## 2025-03-26 PROCEDURE — G2211 COMPLEX E/M VISIT ADD ON: CPT

## 2025-04-02 ENCOUNTER — APPOINTMENT (OUTPATIENT)
Dept: UROLOGY | Facility: CLINIC | Age: 65
End: 2025-04-02

## 2025-04-08 ENCOUNTER — APPOINTMENT (OUTPATIENT)
Dept: MRI IMAGING | Facility: CLINIC | Age: 65
End: 2025-04-08

## 2025-04-08 ENCOUNTER — OUTPATIENT (OUTPATIENT)
Dept: OUTPATIENT SERVICES | Facility: HOSPITAL | Age: 65
LOS: 1 days | End: 2025-04-08

## 2025-04-24 ENCOUNTER — TRANSCRIPTION ENCOUNTER (OUTPATIENT)
Age: 65
End: 2025-04-24

## 2025-04-29 ENCOUNTER — NON-APPOINTMENT (OUTPATIENT)
Age: 65
End: 2025-04-29

## 2025-05-01 ENCOUNTER — NON-APPOINTMENT (OUTPATIENT)
Age: 65
End: 2025-05-01

## 2025-05-02 ENCOUNTER — OUTPATIENT (OUTPATIENT)
Dept: OUTPATIENT SERVICES | Facility: HOSPITAL | Age: 65
LOS: 1 days | End: 2025-05-02

## 2025-05-02 ENCOUNTER — RESULT REVIEW (OUTPATIENT)
Age: 65
End: 2025-05-02

## 2025-05-02 ENCOUNTER — APPOINTMENT (OUTPATIENT)
Dept: MRI IMAGING | Facility: CLINIC | Age: 65
End: 2025-05-02
Payer: COMMERCIAL

## 2025-05-02 PROCEDURE — 72197 MRI PELVIS W/O & W/DYE: CPT | Mod: 26

## 2025-05-02 PROCEDURE — 76498P: CUSTOM | Mod: 26

## 2025-05-05 ENCOUNTER — NON-APPOINTMENT (OUTPATIENT)
Age: 65
End: 2025-05-05

## 2025-05-05 ENCOUNTER — APPOINTMENT (OUTPATIENT)
Dept: UROLOGY | Facility: CLINIC | Age: 65
End: 2025-05-05
Payer: COMMERCIAL

## 2025-05-05 VITALS
HEART RATE: 71 BPM | HEIGHT: 68 IN | BODY MASS INDEX: 28.04 KG/M2 | WEIGHT: 185 LBS | SYSTOLIC BLOOD PRESSURE: 123 MMHG | DIASTOLIC BLOOD PRESSURE: 74 MMHG | TEMPERATURE: 97.4 F

## 2025-05-05 DIAGNOSIS — R33.9 RETENTION OF URINE, UNSPECIFIED: ICD-10-CM

## 2025-05-05 PROCEDURE — G2211 COMPLEX E/M VISIT ADD ON: CPT

## 2025-05-05 PROCEDURE — 99215 OFFICE O/P EST HI 40 MIN: CPT

## 2025-05-08 ENCOUNTER — NON-APPOINTMENT (OUTPATIENT)
Age: 65
End: 2025-05-08

## 2025-06-04 VITALS
DIASTOLIC BLOOD PRESSURE: 78 MMHG | HEIGHT: 68 IN | WEIGHT: 169.98 LBS | SYSTOLIC BLOOD PRESSURE: 128 MMHG | HEART RATE: 61 BPM | OXYGEN SATURATION: 98 % | TEMPERATURE: 98 F | RESPIRATION RATE: 18 BRPM

## 2025-06-04 RX ORDER — OXYBUTYNIN CHLORIDE 5 MG/1
1 TABLET, FILM COATED, EXTENDED RELEASE ORAL
Refills: 0 | DISCHARGE

## 2025-06-04 NOTE — ASU PATIENT PROFILE, ADULT - REASON FOR ADMISSION, PROFILE
Quality 111:Pneumonia Vaccination Status For Older Adults: Pneumococcal vaccine was not administered on or after patient’s 60th birthday and before the end of the measurement period, reason not otherwise specified Quality 130: Documentation Of Current Medications In The Medical Record: Current Medications Documented Quality 154 Part A: Falls: Risk Assessment (Should Be Reported With Measure 155.): Falls risk assessment completed and documented in the past 12 months. Enlarge prostate Quality 431: Preventive Care And Screening: Unhealthy Alcohol Use - Screening: Patient not identified as an unhealthy alcohol user when screened for unhealthy alcohol use using a systematic screening method Quality 400a: One-Time Screening For Hepatitis C Virus (Hcv) For All Patients: Patient received one-time screening for HCV infection Quality 134: Screening For Clinical Depression And Follow-Up Plan: The patient was screened for depression and the screen was negative and no follow up required Detail Level: Detailed Quality 226: Preventive Care And Screening: Tobacco Use: Screening And Cessation Intervention: Patient screened for tobacco use and is an ex/non-smoker Quality 110: Preventive Care And Screening: Influenza Immunization: Influenza Immunization Ordered or Recommended, but not Administered due to system reason

## 2025-06-04 NOTE — ASU PATIENT PROFILE, ADULT - NSICDXPASTSURGICALHX_GEN_ALL_CORE_FT
PAST SURGICAL HISTORY:  H/O inguinal hernia repair     History of appendectomy right     PAST SURGICAL HISTORY:  H/O inguinal hernia repair right    History of appendectomy

## 2025-06-05 ENCOUNTER — APPOINTMENT (OUTPATIENT)
Dept: UROLOGY | Facility: HOSPITAL | Age: 65
End: 2025-06-05

## 2025-06-05 ENCOUNTER — RESULT REVIEW (OUTPATIENT)
Age: 65
End: 2025-06-05

## 2025-06-05 ENCOUNTER — TRANSCRIPTION ENCOUNTER (OUTPATIENT)
Age: 65
End: 2025-06-05

## 2025-06-05 ENCOUNTER — INPATIENT (INPATIENT)
Facility: HOSPITAL | Age: 65
LOS: 0 days | Discharge: HOME | DRG: 714 | End: 2025-06-06
Attending: SURGERY | Admitting: SURGERY
Payer: COMMERCIAL

## 2025-06-05 DIAGNOSIS — Z98.890 OTHER SPECIFIED POSTPROCEDURAL STATES: Chronic | ICD-10-CM

## 2025-06-05 PROCEDURE — 52649 PROSTATE LASER ENUCLEATION: CPT | Mod: 22

## 2025-06-05 PROCEDURE — 88305 TISSUE EXAM BY PATHOLOGIST: CPT | Mod: 26

## 2025-06-05 DEVICE — FIBER OPTICAL PRECISION 550UM SNGL USE: Type: IMPLANTABLE DEVICE | Status: FUNCTIONAL

## 2025-06-05 DEVICE — MOCELLATOR ROTATION SINGLEUSE 4.75: Type: IMPLANTABLE DEVICE | Status: FUNCTIONAL

## 2025-06-05 DEVICE — LASER FIBER MOSES 550 D/F/L: Type: IMPLANTABLE DEVICE | Status: FUNCTIONAL

## 2025-06-05 RX ORDER — DIAZEPAM 5 MG/1
5 TABLET ORAL EVERY 8 HOURS
Refills: 0 | Status: DISCONTINUED | OUTPATIENT
Start: 2025-06-05 | End: 2025-06-06

## 2025-06-05 RX ORDER — PHENAZOPYRIDINE HCL 100 MG
100 TABLET ORAL EVERY 8 HOURS
Refills: 0 | Status: DISCONTINUED | OUTPATIENT
Start: 2025-06-05 | End: 2025-06-06

## 2025-06-05 RX ORDER — ONDANSETRON HCL/PF 4 MG/2 ML
4 VIAL (ML) INJECTION ONCE
Refills: 0 | Status: DISCONTINUED | OUTPATIENT
Start: 2025-06-05 | End: 2025-06-06

## 2025-06-05 RX ORDER — OXYBUTYNIN CHLORIDE 5 MG/1
5 TABLET, FILM COATED, EXTENDED RELEASE ORAL EVERY 8 HOURS
Refills: 0 | Status: DISCONTINUED | OUTPATIENT
Start: 2025-06-05 | End: 2025-06-06

## 2025-06-05 RX ORDER — SODIUM CHLORIDE 9 G/1000ML
1000 INJECTION, SOLUTION INTRAVENOUS
Refills: 0 | Status: DISCONTINUED | OUTPATIENT
Start: 2025-06-05 | End: 2025-06-06

## 2025-06-05 RX ORDER — LIDOCAINE HCL/PF 10 MG/ML
5 VIAL (ML) INJECTION EVERY 8 HOURS
Refills: 0 | Status: DISCONTINUED | OUTPATIENT
Start: 2025-06-05 | End: 2025-06-06

## 2025-06-05 RX ORDER — AMOXICILLIN AND CLAVULANATE POTASSIUM 500; 125 MG/1; MG/1
1 TABLET, FILM COATED ORAL EVERY 12 HOURS
Refills: 0 | Status: DISCONTINUED | OUTPATIENT
Start: 2025-06-05 | End: 2025-06-06

## 2025-06-05 RX ORDER — ACETAMINOPHEN 500 MG/5ML
1000 LIQUID (ML) ORAL EVERY 6 HOURS
Refills: 0 | Status: DISCONTINUED | OUTPATIENT
Start: 2025-06-05 | End: 2025-06-06

## 2025-06-05 RX ADMIN — Medication 1000 MILLIGRAM(S): at 22:37

## 2025-06-05 RX ADMIN — DIAZEPAM 5 MILLIGRAM(S): 5 TABLET ORAL at 22:37

## 2025-06-05 RX ADMIN — Medication 1000 MILLIGRAM(S): at 23:37

## 2025-06-05 NOTE — BRIEF OPERATIVE NOTE - OPERATION/FINDINGS
140 g prostate with urinary retention and ce (Cr up to 12, normalized after ann placement). ejaculatory preservation holep completed (left lateral lobe enucleated) and morecellated. ann placed with cbi and 70 cc in balloon

## 2025-06-05 NOTE — BRIEF OPERATIVE NOTE - NSICDXBRIEFPOSTOP_GEN_ALL_CORE_FT
POST-OP DIAGNOSIS:  BPH with urinary obstruction 05-Jun-2025 21:12:57  Peewee Granger   Statement Selected

## 2025-06-05 NOTE — BRIEF OPERATIVE NOTE - NSICDXBRIEFPROCEDURE_GEN_ALL_CORE_FT
PROCEDURES:  Enucleation, prostate, transurethral, using holmium laser 05-Jun-2025 21:12:47  Peewee Granger

## 2025-06-06 ENCOUNTER — NON-APPOINTMENT (OUTPATIENT)
Age: 65
End: 2025-06-06

## 2025-06-06 ENCOUNTER — TRANSCRIPTION ENCOUNTER (OUTPATIENT)
Age: 65
End: 2025-06-06

## 2025-06-06 VITALS — SYSTOLIC BLOOD PRESSURE: 114 MMHG | DIASTOLIC BLOOD PRESSURE: 66 MMHG | HEART RATE: 78 BPM

## 2025-06-06 LAB
ANION GAP SERPL CALC-SCNC: 13 MMOL/L — SIGNIFICANT CHANGE UP (ref 5–17)
BUN SERPL-MCNC: 12 MG/DL — SIGNIFICANT CHANGE UP (ref 7–23)
CALCIUM SERPL-MCNC: 8.7 MG/DL — SIGNIFICANT CHANGE UP (ref 8.4–10.5)
CHLORIDE SERPL-SCNC: 103 MMOL/L — SIGNIFICANT CHANGE UP (ref 96–108)
CO2 SERPL-SCNC: 21 MMOL/L — LOW (ref 22–31)
CREAT SERPL-MCNC: 0.71 MG/DL — SIGNIFICANT CHANGE UP (ref 0.5–1.3)
EGFR: 102 ML/MIN/1.73M2 — SIGNIFICANT CHANGE UP
EGFR: 102 ML/MIN/1.73M2 — SIGNIFICANT CHANGE UP
GLUCOSE SERPL-MCNC: 183 MG/DL — HIGH (ref 70–99)
HCT VFR BLD CALC: 34.4 % — LOW (ref 39–50)
HGB BLD-MCNC: 11.5 G/DL — LOW (ref 13–17)
MCHC RBC-ENTMCNC: 31.3 PG — SIGNIFICANT CHANGE UP (ref 27–34)
MCHC RBC-ENTMCNC: 33.4 G/DL — SIGNIFICANT CHANGE UP (ref 32–36)
MCV RBC AUTO: 93.7 FL — SIGNIFICANT CHANGE UP (ref 80–100)
NRBC BLD AUTO-RTO: 0 /100 WBCS — SIGNIFICANT CHANGE UP (ref 0–0)
PLATELET # BLD AUTO: 195 K/UL — SIGNIFICANT CHANGE UP (ref 150–400)
POTASSIUM SERPL-MCNC: 4.1 MMOL/L — SIGNIFICANT CHANGE UP (ref 3.5–5.3)
POTASSIUM SERPL-SCNC: 4.1 MMOL/L — SIGNIFICANT CHANGE UP (ref 3.5–5.3)
RBC # BLD: 3.67 M/UL — LOW (ref 4.2–5.8)
RBC # FLD: 13.2 % — SIGNIFICANT CHANGE UP (ref 10.3–14.5)
SODIUM SERPL-SCNC: 137 MMOL/L — SIGNIFICANT CHANGE UP (ref 135–145)
WBC # BLD: 11.35 K/UL — HIGH (ref 3.8–10.5)
WBC # FLD AUTO: 11.35 K/UL — HIGH (ref 3.8–10.5)

## 2025-06-06 PROCEDURE — 85027 COMPLETE CBC AUTOMATED: CPT

## 2025-06-06 PROCEDURE — 36415 COLL VENOUS BLD VENIPUNCTURE: CPT

## 2025-06-06 PROCEDURE — 87186 SC STD MICRODIL/AGAR DIL: CPT

## 2025-06-06 PROCEDURE — 88305 TISSUE EXAM BY PATHOLOGIST: CPT

## 2025-06-06 PROCEDURE — 87086 URINE CULTURE/COLONY COUNT: CPT

## 2025-06-06 PROCEDURE — C1889: CPT

## 2025-06-06 PROCEDURE — 80048 BASIC METABOLIC PNL TOTAL CA: CPT

## 2025-06-06 RX ORDER — AMOXICILLIN AND CLAVULANATE POTASSIUM 500; 125 MG/1; MG/1
1 TABLET, FILM COATED ORAL
Qty: 10 | Refills: 0
Start: 2025-06-06 | End: 2025-06-10

## 2025-06-06 RX ADMIN — AMOXICILLIN AND CLAVULANATE POTASSIUM 1 TABLET(S): 500; 125 TABLET, FILM COATED ORAL at 06:43

## 2025-06-06 RX ADMIN — Medication 1000 MILLIGRAM(S): at 05:48

## 2025-06-06 RX ADMIN — Medication 1000 MILLIGRAM(S): at 04:48

## 2025-06-06 NOTE — H&P ADULT - GENITOURINARY MALE
ann in place yellow urine/normal external genitalia/no discharge/no mass/no tenderness/no ulcer/normal/no penile lesion/no palpable testicular mass/no scrotal mass Hypertension    Kidney stones  s/p ESWL pt unsure site > 5 years ago  Palpitations  1/19 ; pt to ER ;pt states r/t change medications ; pt denies further c/o cp, palpitations, sob

## 2025-06-06 NOTE — DISCHARGE NOTE PROVIDER - NSDCCPCAREPLAN_GEN_ALL_CORE_FT
PRINCIPAL DISCHARGE DIAGNOSIS  Diagnosis: BPH with obstruction/lower urinary tract symptoms  Assessment and Plan of Treatment:

## 2025-06-06 NOTE — DISCHARGE NOTE PROVIDER - NSDCCPTREATMENT_GEN_ALL_CORE_FT
PRINCIPAL PROCEDURE  Procedure: Enucleation, prostate, transurethral, using holmium laser  Findings and Treatment:

## 2025-06-06 NOTE — H&P ADULT - ASSESSMENT
64M PMH BPH with urinary retention complicated by SHORTY (Cr up to 12, now resolved with ann catheter) here for elective ejaculatory sparing HoLEP.    Plan  NPO  OR

## 2025-06-06 NOTE — DISCHARGE NOTE PROVIDER - CARE PROVIDER_API CALL
Stanley Tristan  Urology  [address]  [place] [address]  Phone: [phone]  Fax: [phone]  Scheduled Appointment: 06/09/2025

## 2025-06-06 NOTE — H&P ADULT - GASTROINTESTINAL
negative Billing Type: Third-Party Bill Bill For Surgical Tray: no Expected Date Of Service: 05/05/2021

## 2025-06-06 NOTE — DISCHARGE NOTE PROVIDER - NSDCFUADDINST_GEN_ALL_CORE_FT
Please empty your catheter bag before it becomes half full to ensure that the bag does not overfill  Take tylenol 1000mg every 6 hours as needed for pain    General Discharge Instructions:  Please resume all regular home medications unless specifically advised not to take a particular medication. Also, please take any new medications as prescribed.  Please get plenty of rest, continue to ambulate several times per day, and drink adequate amounts of fluids. Avoid lifting weights greater than 5-10 lbs until you follow-up with your surgeon, who will instruct you further regarding activity restrictions.  Avoid driving or operating heavy machinery while taking pain medications.  Please follow-up with your urologist and Primary Care Provider (PCP) as advised.    Warning Signs:  Please call your doctor or nurse practitioner if you experience the following:  *You experience new chest pain, pressure, squeezing or tightness.  *New or worsening cough, shortness of breath, or wheeze.  *If you are vomiting and cannot keep down fluids or your medications.  *You are getting dehydrated due to continued vomiting, diarrhea, or other reasons.   *You have shaking chills, or fever greater than 100.4F degrees Fahrenheit or 38 degrees Celsius.  *Any change in your symptoms, or any new symptoms that concern you.

## 2025-06-06 NOTE — PROGRESS NOTE ADULT - SUBJECTIVE AND OBJECTIVE BOX
AM Note    No acute events overnight.      Vital Signs Last 24 Hrs  T(C): 36.8 (06-06-25 @ 04:41), Max: 36.8 (06-06-25 @ 04:41)  T(F): 98.2 (06-06-25 @ 04:41), Max: 98.2 (06-06-25 @ 04:41)  HR: 65 (06-06-25 @ 04:41) (59 - 67)  BP: 101/58 (06-06-25 @ 04:41) (101/58 - 128/78)  BP(mean): 86 (06-05-25 @ 22:45) (68 - 92)  RR: 17 (06-06-25 @ 04:41) (12 - 18)  SpO2: 95% (06-06-25 @ 04:41) (95% - 100%)                 I&O's Summary    05 Jun 2025 07:01  -  06 Jun 2025 05:18  --------------------------------------------------------  IN: 9200 mL / OUT: 9250 mL / NET: -50 mL          ON PE: awake and alert    Abdomen: nontender, nondistended    : no suprapubic/CVAT. FC intact CBI on 
  UROLOGY POST OP NOTE (PAGER # [phone])     PROCEDURE: HoLEP    T(C): 36.2 (06-05-25 @ 22:45), Max: 36.6 (06-05-25 @ 16:56)  HR: 66 (06-05-25 @ 22:45) (59 - 67)  BP: 117/70 (06-05-25 @ 22:45) (110/51 - 128/78)  RR: 16 (06-05-25 @ 22:45) (12 - 18)  SpO2: 95% (06-05-25 @ 22:45) (95% - 100%)  Wt(kg): --    Subjective: pain controlled. Denies CP, SOB, N, V, fevers.  ON PE: awake and alert    Abdomen: nontender, nondistended    : no suprapubic/CVAT. FC intact CBI on               A/P:

## 2025-06-06 NOTE — DISCHARGE NOTE NURSING/CASE MANAGEMENT/SOCIAL WORK - FINANCIAL ASSISTANCE
[facility] provides services at a reduced cost to those who are determined to be eligible through [facility]’s financial assistance program. Information regarding [facility]’s financial assistance program can be found by going to [email or web address] or by calling [phone].

## 2025-06-06 NOTE — DISCHARGE NOTE NURSING/CASE MANAGEMENT/SOCIAL WORK - NSDCPEFALRISK_GEN_ALL_CORE
For information on Fall & Injury Prevention, visit: [email or web address] OR  [email or web address] OR  https://www.cdc.gov/steadi/patient.html

## 2025-06-06 NOTE — H&P ADULT - HISTORY OF PRESENT ILLNESS
64M PMH BPH with urinary retention complicated by SHORTY (Cr up to 12, now resolved with ann catheter) here for elective ejaculatory sparing HoLEP.    UCx neg  Cr 1

## 2025-06-06 NOTE — PROGRESS NOTE ADULT - ASSESSMENT
64M hx BPH s/p HoLEP 6/5.    Plan:  -monitor I's and O's  -SCD's  -OOB  -IS  -pain and nausea control  -c/w CBI overnight  -clamp trial in AM  -dispo planning
64M hx BPH s/p HoLEP 6/5.    Plan:  -monitor I's and O's  -SCD's  -OOB  -IS  -pain and nausea control  -c/w CBI overnight  -clamp trial in AM  -dispo planning

## 2025-06-06 NOTE — DISCHARGE NOTE NURSING/CASE MANAGEMENT/SOCIAL WORK - PATIENT PORTAL LINK FT
You can access the FollowMyHealth Patient Portal offered by [facility] by registering at the following website: [email or web address]. By joining [employer]’s FollowMyHealth portal, you will also be able to view your health information using other applications (apps) compatible with our system.

## 2025-06-06 NOTE — DISCHARGE NOTE PROVIDER - HOSPITAL COURSE
64M presents for elective HOLEP. Procedure without intraoperative complications. Shipley in place. PACU stay uneventful. Transferred to regional floor. Patient ambulating, tolerating CLD without N/V, pain well controlled, urine clear pink off CBI. Meeting all milestones for discharge. Home with catheter.

## 2025-06-07 PROBLEM — R33.9 RETENTION OF URINE, UNSPECIFIED: Chronic | Status: ACTIVE | Noted: 2025-06-04

## 2025-06-09 ENCOUNTER — APPOINTMENT (OUTPATIENT)
Dept: UROLOGY | Facility: CLINIC | Age: 65
End: 2025-06-09
Payer: COMMERCIAL

## 2025-06-09 VITALS
SYSTOLIC BLOOD PRESSURE: 122 MMHG | TEMPERATURE: 98.1 F | HEIGHT: 68 IN | HEART RATE: 74 BPM | BODY MASS INDEX: 28.04 KG/M2 | DIASTOLIC BLOOD PRESSURE: 68 MMHG | WEIGHT: 185 LBS

## 2025-06-09 PROCEDURE — 99024 POSTOP FOLLOW-UP VISIT: CPT

## 2025-06-12 DIAGNOSIS — R33.8 OTHER RETENTION OF URINE: ICD-10-CM

## 2025-06-12 DIAGNOSIS — N40.1 BENIGN PROSTATIC HYPERPLASIA WITH LOWER URINARY TRACT SYMPTOMS: ICD-10-CM

## 2025-06-17 RX ORDER — TAMSULOSIN HYDROCHLORIDE 0.4 MG/1
0.4 CAPSULE ORAL
Qty: 90 | Refills: 3 | Status: ACTIVE | COMMUNITY
Start: 2025-06-09 | End: 1900-01-01

## 2025-06-18 ENCOUNTER — APPOINTMENT (OUTPATIENT)
Dept: UROLOGY | Facility: CLINIC | Age: 65
End: 2025-06-18
Payer: COMMERCIAL

## 2025-06-18 VITALS
WEIGHT: 185 LBS | HEART RATE: 98 BPM | TEMPERATURE: 98 F | SYSTOLIC BLOOD PRESSURE: 110 MMHG | DIASTOLIC BLOOD PRESSURE: 68 MMHG | HEIGHT: 68 IN | BODY MASS INDEX: 28.04 KG/M2

## 2025-06-18 PROCEDURE — 99024 POSTOP FOLLOW-UP VISIT: CPT

## 2025-06-23 ENCOUNTER — NON-APPOINTMENT (OUTPATIENT)
Age: 65
End: 2025-06-23

## 2025-06-23 ENCOUNTER — APPOINTMENT (OUTPATIENT)
Dept: UROLOGY | Facility: CLINIC | Age: 65
End: 2025-06-23
Payer: COMMERCIAL

## 2025-06-23 VITALS
WEIGHT: 185 LBS | TEMPERATURE: 98.6 F | DIASTOLIC BLOOD PRESSURE: 66 MMHG | HEART RATE: 75 BPM | SYSTOLIC BLOOD PRESSURE: 111 MMHG | BODY MASS INDEX: 28.04 KG/M2 | HEIGHT: 68 IN

## 2025-06-23 PROCEDURE — 99024 POSTOP FOLLOW-UP VISIT: CPT

## 2025-06-24 ENCOUNTER — NON-APPOINTMENT (OUTPATIENT)
Age: 65
End: 2025-06-24

## 2025-06-24 LAB
ALBUMIN SERPL ELPH-MCNC: 4.1 G/DL
ALP BLD-CCNC: 79 U/L
ALT SERPL-CCNC: 25 U/L
ANION GAP SERPL CALC-SCNC: 15 MMOL/L
APPEARANCE: ABNORMAL
AST SERPL-CCNC: 32 U/L
BACTERIA: ABNORMAL /HPF
BILIRUB SERPL-MCNC: 0.3 MG/DL
BILIRUBIN URINE: NEGATIVE
BLOOD URINE: ABNORMAL
BUN SERPL-MCNC: 13 MG/DL
CALCIUM SERPL-MCNC: 9.5 MG/DL
CAST: 2 /LPF
CHLORIDE SERPL-SCNC: 102 MMOL/L
CO2 SERPL-SCNC: 24 MMOL/L
COLOR: YELLOW
CREAT SERPL-MCNC: 0.78 MG/DL
EGFRCR SERPLBLD CKD-EPI 2021: 100 ML/MIN/1.73M2
EPITHELIAL CELLS: 0 /HPF
GLUCOSE QUALITATIVE U: NEGATIVE MG/DL
GLUCOSE SERPL-MCNC: 85 MG/DL
KETONES URINE: NEGATIVE MG/DL
LEUKOCYTE ESTERASE URINE: ABNORMAL
MICROSCOPIC-UA: NORMAL
NITRITE URINE: NEGATIVE
PH URINE: 6
POTASSIUM SERPL-SCNC: 4.9 MMOL/L
PROT SERPL-MCNC: 6.8 G/DL
PROTEIN URINE: 300 MG/DL
RED BLOOD CELLS URINE: 256 /HPF
REVIEW: NORMAL
SODIUM SERPL-SCNC: 141 MMOL/L
SPECIFIC GRAVITY URINE: 1.02
UROBILINOGEN URINE: 1 MG/DL
WHITE BLOOD CELLS URINE: 813 /HPF

## 2025-06-29 ENCOUNTER — INPATIENT (INPATIENT)
Facility: HOSPITAL | Age: 65
LOS: 1 days | Discharge: HOME HEALTH | End: 2025-07-01
Attending: UROLOGY | Admitting: UROLOGY
Payer: COMMERCIAL

## 2025-06-29 VITALS
HEIGHT: 68 IN | SYSTOLIC BLOOD PRESSURE: 109 MMHG | TEMPERATURE: 98 F | RESPIRATION RATE: 19 BRPM | DIASTOLIC BLOOD PRESSURE: 74 MMHG | HEART RATE: 68 BPM | OXYGEN SATURATION: 98 % | WEIGHT: 169.98 LBS

## 2025-06-29 DIAGNOSIS — Z98.890 OTHER SPECIFIED POSTPROCEDURAL STATES: Chronic | ICD-10-CM

## 2025-06-29 LAB
ANION GAP SERPL CALC-SCNC: 11 MMOL/L — SIGNIFICANT CHANGE UP (ref 5–17)
APPEARANCE UR: ABNORMAL
BASOPHILS # BLD AUTO: 0.01 K/UL — SIGNIFICANT CHANGE UP (ref 0–0.2)
BASOPHILS NFR BLD AUTO: 0.1 % — SIGNIFICANT CHANGE UP (ref 0–2)
BILIRUB UR-MCNC: NEGATIVE — SIGNIFICANT CHANGE UP
BUN SERPL-MCNC: 17 MG/DL — SIGNIFICANT CHANGE UP (ref 7–23)
CALCIUM SERPL-MCNC: 8.9 MG/DL — SIGNIFICANT CHANGE UP (ref 8.4–10.5)
CHLORIDE SERPL-SCNC: 102 MMOL/L — SIGNIFICANT CHANGE UP (ref 96–108)
CO2 SERPL-SCNC: 25 MMOL/L — SIGNIFICANT CHANGE UP (ref 22–31)
COLOR SPEC: YELLOW — SIGNIFICANT CHANGE UP
CREAT SERPL-MCNC: 0.82 MG/DL — SIGNIFICANT CHANGE UP (ref 0.5–1.3)
DIFF PNL FLD: ABNORMAL
EGFR: 98 ML/MIN/1.73M2 — SIGNIFICANT CHANGE UP
EGFR: 98 ML/MIN/1.73M2 — SIGNIFICANT CHANGE UP
EOSINOPHIL # BLD AUTO: 0.17 K/UL — SIGNIFICANT CHANGE UP (ref 0–0.5)
EOSINOPHIL NFR BLD AUTO: 1.9 % — SIGNIFICANT CHANGE UP (ref 0–6)
GLUCOSE SERPL-MCNC: 166 MG/DL — HIGH (ref 70–99)
GLUCOSE UR QL: NEGATIVE MG/DL — SIGNIFICANT CHANGE UP
HCT VFR BLD CALC: 30.4 % — LOW (ref 39–50)
HGB BLD-MCNC: 9.8 G/DL — LOW (ref 13–17)
IMM GRANULOCYTES # BLD AUTO: 0.07 K/UL — SIGNIFICANT CHANGE UP (ref 0–0.07)
IMM GRANULOCYTES NFR BLD AUTO: 0.8 % — SIGNIFICANT CHANGE UP (ref 0–0.9)
KETONES UR QL: NEGATIVE MG/DL — SIGNIFICANT CHANGE UP
LACTATE SERPL-SCNC: 1.1 MMOL/L — SIGNIFICANT CHANGE UP (ref 0.5–2)
LEUKOCYTE ESTERASE UR-ACNC: ABNORMAL
LYMPHOCYTES # BLD AUTO: 1.49 K/UL — SIGNIFICANT CHANGE UP (ref 1–3.3)
LYMPHOCYTES NFR BLD AUTO: 16.8 % — SIGNIFICANT CHANGE UP (ref 13–44)
MCHC RBC-ENTMCNC: 30.3 PG — SIGNIFICANT CHANGE UP (ref 27–34)
MCHC RBC-ENTMCNC: 32.2 G/DL — SIGNIFICANT CHANGE UP (ref 32–36)
MCV RBC AUTO: 94.1 FL — SIGNIFICANT CHANGE UP (ref 80–100)
MONOCYTES # BLD AUTO: 0.97 K/UL — HIGH (ref 0–0.9)
MONOCYTES NFR BLD AUTO: 11 % — SIGNIFICANT CHANGE UP (ref 2–14)
NEUTROPHILS # BLD AUTO: 6.14 K/UL — SIGNIFICANT CHANGE UP (ref 1.8–7.4)
NEUTROPHILS NFR BLD AUTO: 69.4 % — SIGNIFICANT CHANGE UP (ref 43–77)
NITRITE UR-MCNC: POSITIVE
NRBC # BLD AUTO: 0 K/UL — SIGNIFICANT CHANGE UP (ref 0–0)
NRBC # FLD: 0 K/UL — SIGNIFICANT CHANGE UP (ref 0–0)
NRBC BLD AUTO-RTO: 0 /100 WBCS — SIGNIFICANT CHANGE UP (ref 0–0)
PH UR: 5.5 — SIGNIFICANT CHANGE UP (ref 5–8)
PLATELET # BLD AUTO: 260 K/UL — SIGNIFICANT CHANGE UP (ref 150–400)
PMV BLD: 10.6 FL — SIGNIFICANT CHANGE UP (ref 7–13)
POTASSIUM SERPL-MCNC: 4.2 MMOL/L — SIGNIFICANT CHANGE UP (ref 3.5–5.3)
POTASSIUM SERPL-SCNC: 4.2 MMOL/L — SIGNIFICANT CHANGE UP (ref 3.5–5.3)
PROT UR-MCNC: 100 MG/DL
RBC # BLD: 3.23 M/UL — LOW (ref 4.2–5.8)
RBC # FLD: 13.1 % — SIGNIFICANT CHANGE UP (ref 10.3–14.5)
SODIUM SERPL-SCNC: 138 MMOL/L — SIGNIFICANT CHANGE UP (ref 135–145)
SP GR SPEC: 1.02 — SIGNIFICANT CHANGE UP (ref 1–1.03)
UROBILINOGEN FLD QL: 1 MG/DL — SIGNIFICANT CHANGE UP (ref 0.2–1)
WBC # BLD: 8.85 K/UL — SIGNIFICANT CHANGE UP (ref 3.8–10.5)
WBC # FLD AUTO: 8.85 K/UL — SIGNIFICANT CHANGE UP (ref 3.8–10.5)

## 2025-06-29 PROCEDURE — 99285 EMERGENCY DEPT VISIT HI MDM: CPT

## 2025-06-29 PROCEDURE — 76870 US EXAM SCROTUM: CPT | Mod: 26

## 2025-06-29 RX ORDER — ACETAMINOPHEN 500 MG/5ML
1000 LIQUID (ML) ORAL ONCE
Refills: 0 | Status: COMPLETED | OUTPATIENT
Start: 2025-06-29 | End: 2025-06-29

## 2025-06-29 RX ORDER — SODIUM CHLORIDE 9 G/1000ML
1000 INJECTION, SOLUTION INTRAVENOUS
Refills: 0 | Status: DISCONTINUED | OUTPATIENT
Start: 2025-06-29 | End: 2025-06-30

## 2025-06-29 RX ORDER — ACETAMINOPHEN 500 MG/5ML
650 LIQUID (ML) ORAL EVERY 6 HOURS
Refills: 0 | Status: DISCONTINUED | OUTPATIENT
Start: 2025-06-29 | End: 2025-07-01

## 2025-06-29 RX ORDER — OXYCODONE HYDROCHLORIDE 30 MG/1
5 TABLET ORAL EVERY 6 HOURS
Refills: 0 | Status: DISCONTINUED | OUTPATIENT
Start: 2025-06-29 | End: 2025-07-01

## 2025-06-29 RX ORDER — IBUPROFEN 200 MG
400 TABLET ORAL EVERY 6 HOURS
Refills: 0 | Status: DISCONTINUED | OUTPATIENT
Start: 2025-06-29 | End: 2025-07-01

## 2025-06-29 RX ORDER — ERTAPENEM SODIUM 1 G/1
1000 INJECTION, POWDER, LYOPHILIZED, FOR SOLUTION INTRAMUSCULAR; INTRAVENOUS EVERY 24 HOURS
Refills: 0 | Status: COMPLETED | OUTPATIENT
Start: 2025-06-29 | End: 2025-07-01

## 2025-06-29 RX ORDER — PIPERACILLIN-TAZO-DEXTROSE,ISO 3.375G/5
3.38 IV SOLUTION, PIGGYBACK PREMIX FROZEN(ML) INTRAVENOUS ONCE
Refills: 0 | Status: COMPLETED | OUTPATIENT
Start: 2025-06-29 | End: 2025-06-29

## 2025-06-29 RX ADMIN — Medication 200 GRAM(S): at 05:15

## 2025-06-29 RX ADMIN — Medication 1000 MILLIGRAM(S): at 17:27

## 2025-06-29 RX ADMIN — ERTAPENEM SODIUM 120 MILLIGRAM(S): 1 INJECTION, POWDER, LYOPHILIZED, FOR SOLUTION INTRAMUSCULAR; INTRAVENOUS at 11:08

## 2025-06-29 RX ADMIN — Medication 400 MILLIGRAM(S): at 17:12

## 2025-06-29 RX ADMIN — Medication 650 MILLIGRAM(S): at 12:09

## 2025-06-29 RX ADMIN — Medication 650 MILLIGRAM(S): at 11:09

## 2025-06-29 RX ADMIN — SODIUM CHLORIDE 60 MILLILITER(S): 9 INJECTION, SOLUTION INTRAVENOUS at 11:08

## 2025-06-29 RX ADMIN — Medication 1000 MILLILITER(S): at 05:13

## 2025-06-29 NOTE — ED ADULT NURSE NOTE - OBJECTIVE STATEMENT
Pt is alert and OX3 ambulatory presents to ED with c/o fever at home, Denies cp and sob, Pt post prostate 6/2/25. Afebrile in triage.

## 2025-06-29 NOTE — PROVIDER CONTACT NOTE (CRITICAL VALUE NOTIFICATION) - SITUATION
Routine vitals taken. Blood pressure 108/68, heart rate 86, 97% on room air, oral temperature of 100.9.

## 2025-06-29 NOTE — CHART NOTE - NSCHARTNOTEFT_GEN_A_CORE
Infectious Diseases Anti-infective Approval Note    Medication:  Ertapenem  Dose:  1 gram  Route:  IV   Frequency: daily  Duration**:  7 days   Purpose:  (check one)       Empiric: pending cultures       Empiric:  no culture data       Final duration:  X    Dose may be adjusted as needed for alterations in renal function.    *THIS IS NOT AN INFECTIOUS DISEASES CONSULTATION*    **Indicates duration of approval, not necessarily duration of treatment.

## 2025-06-29 NOTE — H&P ADULT - NSHPLABSRESULTS_GEN_ALL_CORE
Scrotal U/S:     FINDINGS:    RIGHT:  Right testis: 4.3 cm x 2.0 cm x 2.7 cm. Normal echogenicity and echotexture with no masses or areas of architectural distortion. Normal arterial and venous blood flow pattern.  Right epididymis: Within normal limits.  Right hydrocele: None.  Right varicocele: None.      LEFT:  Left testis: 4.0 cm x 2.5 cm x 3.7 cm. Normal echogenicity and echotexture with no masses or areas of architectural distortion. Hypervascular. Normal arterial and venous blood flow pattern.  Left epididymis: Hypervascular, enlarged  Left hydrocele: Small, complex  Left varicocele: Borderline  Paratesticular veins: No varicocele (<2.5mm). Borderline varicocele (2.5-2.9mm). Varicocele present (= or >3.0mm)  Supine without valsalva: 2.6 mm  Supine with valsalva: 2.5 mm    IMPRESSION:    1. Findings suggestive of left epididymoorchitis.  2. Complex left hydrocele superinfection may be considered in the proper clinical scenario.

## 2025-06-29 NOTE — PROVIDER CONTACT NOTE (CRITICAL VALUE NOTIFICATION) - BACKGROUND
Patient has history of urinary retention, s/p HoLEP on 6/5/25, presenting with fever last night with testicular swelling with edema. Patient had tmax 100.7 night before admission and took tylenol.

## 2025-06-29 NOTE — ED PROVIDER NOTE - CLINICAL SUMMARY MEDICAL DECISION MAKING FREE TEXT BOX
pt s/p prostate surg on 6/5, sent in by his gu for iv abx, reports low grade fever since yest (99), has not taken any meds fever, reports testicular swelling and rectal pain, well appearing, non toxic, afebrile, labs wnl. pan cultured, abx given as per gu, us ordered as per gu, anticipate admission - pending official recs

## 2025-06-29 NOTE — ED PROVIDER NOTE - NSCAREINITIATED _GEN_ER
Chief Complaint   Patient presents with    Eval/Assessment     ADHD            Karen Perrin on 3/18/2024 at 1:01 PM   
Poornima Curiel)

## 2025-06-29 NOTE — H&P ADULT - HISTORY OF PRESENT ILLNESS
64 year old male with urinary retention, Cr to 12.2 normalized with ann placement, 150 cc prostate on MRI, several failed void trials. S/P Ejaculation preserving HoLEP 6/5/25 presents w/ fever last night and R testiclar swelling x 2 days. Of note, Procedure uncomplicated, discharged POD 1 with ann catheter. Per chart review, pt seen at Dr. Tristan office where void trial POD 4 unsuccessful, successful on 6/23 (POD). During this office visit, Ucx collected which is growing 100K MDR ESBL Klebsiella Pneumonia. Pt has since c/o R testicular pain and swelling x 2 days. Also c/o rectal pain (after defectaion) since sx. States he had a fever of tmax 100.7 at 10:30 pm last night; took tylnol after. Denies chills, nausea, vomiting, dysuria, hematuria, issues voiding/ emptying bladder, abd pain, flank pain.  Pt voided before encounter, PVR 0cc.    PAST MEDICAL & SURGICAL HISTORY:  BPH (benign prostatic hyperplasia)  Urinary retention  H/O inguinal hernia repairright  History of appendectomy    HOME MEDICATIONS:  Augmentin    ALLERGIES:  No Known Allergies  Intolerances    SOCIAL HISTORY:  Former Smoker

## 2025-06-29 NOTE — PROVIDER CONTACT NOTE (CRITICAL VALUE NOTIFICATION) - ACTION/TREATMENT ORDERED:
VERONICA Valdes informed and ordered IV tylenol. RN provided ice packs and rechecked oral temperature of 98.8. Patient is now comfortable and resting in bed.
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
## 2025-06-29 NOTE — ED PROVIDER NOTE - CONSTITUTIONAL MOOD
ID Focus Note:   Chart reviewed. No change in clinical status. E faecalis is consistent with urinary colonization. No evidence of active infection. Monitor off antibiotics.     ID will sign off at this time but please don't hesitate to reach out if questions or concerns arise.     Case d/w Dr. Suggs.     Shahid Heath PA-C  Infectious Disease  Pager: [phone]  Office: [phone]     appropriate

## 2025-06-29 NOTE — H&P ADULT - ASSESSMENT
65 yo w/ BPH, urinary retention s/p HoLEP on 6/5 that failed TOV on POD#4, subsequently passed on POD#9, but eventually developed T testicular pain, swelling, and pain w/ defecation,  2 days ago, and spiked a fever of 100.7 last night. UCx is growing MDR ESBL K Pneumoniae, for which he was on Augmentin. Today pt  CBC and BMP mostly within normal limits, and is hemodynamically stable. He is also appropriately emptying his bladder, despite hematuria given . However, given fevers, and untreated MDR UTI, pt will need admission for IV Abx, and workup of scrotal pain.     Plan:  Admit to regional under Dr. Eric Velez  IV Abx - Ertapenem  NSAIDs, Ice packs PRN for scrotal pain  Regular Diet  ID consult for Abx approval  Scrotal U/S  AM Labs       65 yo w/ BPH, urinary retention s/p HoLEP on 6/5 that failed TOV on POD#4, subsequently passed on POD#9, but eventually developed L T testicular pain, swelling, and pain w/ defecation,  2 days ago, and spiked a fever of 100.7 last night. UCx is growing MDR ESBL K Pneumoniae, for which he was on Augmentin. Today pt  CBC and BMP mostly within normal limits, and is hemodynamically stable. He is also appropriately emptying his bladder, despite hematuria given . However, given fevers, and untreated MDR UTI, pt will need admission for IV Abx, and workup of scrotal pain.     Plan:  Admit to regional under Dr. Eric Velez  IV Abx - Ertapenem  NSAIDs, Ice packs PRN for scrotal pain  Regular Diet  ID consult for Abx approval  Scrotal U/S  AM Labs

## 2025-06-29 NOTE — ED ADULT NURSE NOTE - MUSCULOSKELETAL ASSESSMENT
Received request via: Pharmacy    Was the patient seen in the last year in this department? Yes    Does the patient have an active prescription (recently filled or refills available) for medication(s) requested? No    Does the patient have jail Plus and need 100 day supply (blood pressure, diabetes and cholesterol meds only)? Patient does not have SCP   - - -

## 2025-06-29 NOTE — H&P ADULT - NSHPPHYSICALEXAM_GEN_ALL_CORE
gen: awake and alert  lungs: clear to auscultation  heart: normal s1 and s2  abd: nontender, nondistended  : no suprapubic/CVAT  Penis: wnl  scrotum: + b/l erythema, swelling L> R   L testicle: enlarged, exquistly tender to palpation  R testicle: wnl, nontender to palpation  SEBAS: neg gen: awake and alert  lungs: clear to auscultation  heart: normal s1 and s2  abd: nontender, nondistended  : no suprapubic/CVAT  Penis: wnl  scrotum: + b/l erythema, swelling L> R  L testicle: enlarged, exquistly tender to palpation, prominent epidydmis  R testicle: wnl, nontender to palpation  SEBAS: neg

## 2025-06-29 NOTE — ED PROVIDER NOTE - PROGRESS NOTE DETAILS
Ree: pt received from VERONICA Curiel and Dr. Kate at s/o; pt is s/p prostate surg on 6/5, sent in by his urologist for iv abx. + rectal/ testicular pain. Seen by gu and testicular us pending. Pt given zosyn for uti. Will await us results and urology consult recs. Dispo pending.

## 2025-06-29 NOTE — ED PROVIDER NOTE - OBJECTIVE STATEMENT
The pt is a 63 y/o M, who presents to ED stating "dr quezada told to come for abx" -- s/p prostate surg on 6/5, reports low grade fever (99) at home. Pt c/o rectal pressure and testicular swelling. Has not taken any tyl or motrin. Denies cough, cp, sob, n/v/d, dysuria, hematuria, flank pain, dizziness, syncope.

## 2025-06-29 NOTE — ED PROVIDER NOTE - ATTENDING APP SHARED VISIT CONTRIBUTION OF CARE
Vitals wnl, exam as above.   Hgb mildly decreased to 9.8, other labs grossly wnl.   Evaluated by , recommending US.   Suspicious for resistant UTI/possible prostatitis, possible testicular infection.   Signed out pending US/ recs.   Remains stable in ED.

## 2025-06-30 LAB
ANION GAP SERPL CALC-SCNC: 11 MMOL/L — SIGNIFICANT CHANGE UP (ref 5–17)
BACTERIA UR CULT: ABNORMAL
BASOPHILS # BLD AUTO: 0.02 K/UL — SIGNIFICANT CHANGE UP (ref 0–0.2)
BASOPHILS NFR BLD AUTO: 0.2 % — SIGNIFICANT CHANGE UP (ref 0–2)
BUN SERPL-MCNC: 12 MG/DL — SIGNIFICANT CHANGE UP (ref 7–23)
CALCIUM SERPL-MCNC: 8.6 MG/DL — SIGNIFICANT CHANGE UP (ref 8.4–10.5)
CHLORIDE SERPL-SCNC: 104 MMOL/L — SIGNIFICANT CHANGE UP (ref 96–108)
CO2 SERPL-SCNC: 23 MMOL/L — SIGNIFICANT CHANGE UP (ref 22–31)
CREAT SERPL-MCNC: 0.68 MG/DL — SIGNIFICANT CHANGE UP (ref 0.5–1.3)
EGFR: 104 ML/MIN/1.73M2 — SIGNIFICANT CHANGE UP
EGFR: 104 ML/MIN/1.73M2 — SIGNIFICANT CHANGE UP
EOSINOPHIL # BLD AUTO: 0.13 K/UL — SIGNIFICANT CHANGE UP (ref 0–0.5)
EOSINOPHIL NFR BLD AUTO: 1.2 % — SIGNIFICANT CHANGE UP (ref 0–6)
GLUCOSE SERPL-MCNC: 110 MG/DL — HIGH (ref 70–99)
HCT VFR BLD CALC: 27.8 % — LOW (ref 39–50)
HGB BLD-MCNC: 9 G/DL — LOW (ref 13–17)
IMM GRANULOCYTES # BLD AUTO: 0.06 K/UL — SIGNIFICANT CHANGE UP (ref 0–0.07)
IMM GRANULOCYTES NFR BLD AUTO: 0.5 % — SIGNIFICANT CHANGE UP (ref 0–0.9)
LYMPHOCYTES # BLD AUTO: 1.17 K/UL — SIGNIFICANT CHANGE UP (ref 1–3.3)
LYMPHOCYTES NFR BLD AUTO: 10.4 % — LOW (ref 13–44)
MAGNESIUM SERPL-MCNC: 2 MG/DL — SIGNIFICANT CHANGE UP (ref 1.6–2.6)
MCHC RBC-ENTMCNC: 30.2 PG — SIGNIFICANT CHANGE UP (ref 27–34)
MCHC RBC-ENTMCNC: 32.4 G/DL — SIGNIFICANT CHANGE UP (ref 32–36)
MCV RBC AUTO: 93.3 FL — SIGNIFICANT CHANGE UP (ref 80–100)
MONOCYTES # BLD AUTO: 1.12 K/UL — HIGH (ref 0–0.9)
MONOCYTES NFR BLD AUTO: 10 % — SIGNIFICANT CHANGE UP (ref 2–14)
NEUTROPHILS # BLD AUTO: 8.7 K/UL — HIGH (ref 1.8–7.4)
NEUTROPHILS NFR BLD AUTO: 77.7 % — HIGH (ref 43–77)
NRBC # BLD AUTO: 0 K/UL — SIGNIFICANT CHANGE UP (ref 0–0)
NRBC # FLD: 0 K/UL — SIGNIFICANT CHANGE UP (ref 0–0)
NRBC BLD AUTO-RTO: 0 /100 WBCS — SIGNIFICANT CHANGE UP (ref 0–0)
PHOSPHATE SERPL-MCNC: 2.2 MG/DL — LOW (ref 2.5–4.5)
PLATELET # BLD AUTO: 251 K/UL — SIGNIFICANT CHANGE UP (ref 150–400)
PMV BLD: 10.8 FL — SIGNIFICANT CHANGE UP (ref 7–13)
POTASSIUM SERPL-MCNC: 3.8 MMOL/L — SIGNIFICANT CHANGE UP (ref 3.5–5.3)
POTASSIUM SERPL-SCNC: 3.8 MMOL/L — SIGNIFICANT CHANGE UP (ref 3.5–5.3)
RBC # BLD: 2.98 M/UL — LOW (ref 4.2–5.8)
RBC # FLD: 13.2 % — SIGNIFICANT CHANGE UP (ref 10.3–14.5)
SODIUM SERPL-SCNC: 138 MMOL/L — SIGNIFICANT CHANGE UP (ref 135–145)
WBC # BLD: 11.2 K/UL — HIGH (ref 3.8–10.5)
WBC # FLD AUTO: 11.2 K/UL — HIGH (ref 3.8–10.5)

## 2025-06-30 PROCEDURE — 87086 URINE CULTURE/COLONY COUNT: CPT

## 2025-06-30 PROCEDURE — 81001 URINALYSIS AUTO W/SCOPE: CPT

## 2025-06-30 PROCEDURE — 87040 BLOOD CULTURE FOR BACTERIA: CPT

## 2025-06-30 PROCEDURE — 36415 COLL VENOUS BLD VENIPUNCTURE: CPT

## 2025-06-30 PROCEDURE — 80048 BASIC METABOLIC PNL TOTAL CA: CPT

## 2025-06-30 PROCEDURE — 84100 ASSAY OF PHOSPHORUS: CPT

## 2025-06-30 PROCEDURE — 74177 CT ABD & PELVIS W/CONTRAST: CPT | Mod: 26

## 2025-06-30 PROCEDURE — 83605 ASSAY OF LACTIC ACID: CPT

## 2025-06-30 PROCEDURE — 85025 COMPLETE CBC W/AUTO DIFF WBC: CPT

## 2025-06-30 PROCEDURE — 83735 ASSAY OF MAGNESIUM: CPT

## 2025-06-30 PROCEDURE — 99222 1ST HOSP IP/OBS MODERATE 55: CPT

## 2025-06-30 RX ORDER — SOD PHOS DI, MONO/K PHOS MONO 250 MG
1 TABLET ORAL ONCE
Refills: 0 | Status: COMPLETED | OUTPATIENT
Start: 2025-06-30 | End: 2025-06-30

## 2025-06-30 RX ADMIN — ERTAPENEM SODIUM 120 MILLIGRAM(S): 1 INJECTION, POWDER, LYOPHILIZED, FOR SOLUTION INTRAMUSCULAR; INTRAVENOUS at 12:09

## 2025-06-30 RX ADMIN — Medication 1 PACKET(S): at 20:14

## 2025-06-30 NOTE — CONSULT NOTE ADULT - NS ATTEND AMEND GEN_ALL_CORE FT
Agree with above. Patient with epididymoorchitis secondary to ESBL klebsiella.  He also has vague other complaints including left inguinal pain and pain with defectation.  Patient with recent procedure on prostate to treat for BPH.  Given this history and his complaints, can check CT abd/pelvis with IV contrast to evaluate his complaints and to evaluate for prostatitis.  If he has prostatitis, would treat for 28 days.  As of now he will need 14 days of antibiotics.  Continue Ertapenem 1 gram IV daily. ID team 2 will follow

## 2025-06-30 NOTE — CONSULT NOTE ADULT - ASSESSMENT
64 year old male with BPH S/P Ejaculation preserving HoLEP 6/5/25 presents w/ fever last night and R testicular swelling x 2 days found to have UTI c/b L epididymoorchitis and symptoms concerning for prostatitis. Afebrile today with mild leukocytosis. Ucx 6/23 (OSH) grew ESBL Kleb pneumoniae (in HIE). Will need at least 14 day course of IV ertapenem for L epididymoorchitis, but will need 4 weeks if there is prostate involvement     Suggest:  -f/u blood cultures x 2 6/29 in lab  -f/u UCx   -obtain CT AP with IVC to evaluate prostate   -continue Ertapenem 1g IV Q24     Team 2 will follow you.    Case d/w primary team.  Final recommendation pending attending note.    Padma Leroy, Infectious Diseases PA  Please reach out for any questions 9 am-5pm.   For evenings and weekends, please call the ID physician on call.

## 2025-06-30 NOTE — CONSULT NOTE ADULT - SUBJECTIVE AND OBJECTIVE BOX
INFECTIOUS DISEASES INITIAL CONSULT NOTE    HPI:  64 year old male with BPH S/P Ejaculation preserving HoLEP 6/5/25 presents w/ fever last night and R testicular swelling x 2 days. ID consulted for UTI.  Of note, Recent procedure was uncomplicated, discharged POD 1 with ann catheter. Per chart review, pt seen at Dr. Tristan office where void trial POD 4 unsuccessful, successful on 6/23. During this office visit, Ucx collected which is growing 100K MDR ESBL Klebsiella Pneumonia. Pt has since c/o R testicular pain and swelling x 2 days. Also c/o rectal pain (after defectaion) since sx started. States he had a fever of tmax 100.7 at 10:30 pm last night; took tylnol after. Denies chills, nausea, vomiting, dysuria, hematuria, issues voiding/ emptying bladder, abd pain, flank pain. On arrival, febrile 100.9 with WBC 8 -->11. Labs notable for Cr 0.82. UA +. Had US of testicles showing L epididymoorchitis. Complains of rectal pain worse with bowel movements.        PAST MEDICAL & SURGICAL HISTORY:  BPH (benign prostatic hyperplasia)      Urinary retention      H/O inguinal hernia repair  right      History of appendectomy            Review of Systems:   Constitutional, eyes, ENT, cardiovascular, respiratory, gastrointestinal, genitourinary, integumentary, neurological, psychiatric and heme/lymph are otherwise negative other than noted above       ANTIBIOTICS:  MEDICATIONS  (STANDING):  ertapenem  IVPB 1000 milliGRAM(s) IV Intermittent every 24 hours  potassium phosphate / sodium phosphate Powder (PHOS-NaK) 1 Packet(s) Oral once    MEDICATIONS  (PRN):  acetaminophen     Tablet .. 650 milliGRAM(s) Oral every 6 hours PRN Moderate Pain (4 - 6)  ibuprofen  Tablet. 400 milliGRAM(s) Oral every 6 hours PRN Mild Pain (1 - 3)  oxyCODONE    IR 5 milliGRAM(s) Oral every 6 hours PRN Severe Pain (7 - 10)      Allergies    No Known Allergies    Intolerances      SOCIAL HISTORY:  -lives [facility] with family     FAMILY HISTORY:   no FH leading to current infection    Vital Signs Last 24 Hrs  T(C): 36.7 (30 Jun 2025 08:43), Max: 38.3 (29 Jun 2025 16:55)  T(F): 98.1 (30 Jun 2025 08:43), Max: 100.9 (29 Jun 2025 16:55)  HR: 73 (30 Jun 2025 08:43) (73 - 86)  BP: 107/67 (30 Jun 2025 08:43) (99/61 - 108/68)  BP(mean): 81 (30 Jun 2025 05:12) (74 - 81)  RR: 17 (30 Jun 2025 08:43) (17 - 17)  SpO2: 93% (30 Jun 2025 08:43) (93% - 97%)    Parameters below as of 30 Jun 2025 08:43  Patient On (Oxygen Delivery Method): room air        06-29-25 @ 07:01  -  06-30-25 @ 07:00  --------------------------------------------------------  IN: 780 mL / OUT: 1500 mL / NET: -720 mL    06-30-25 @ 07:01  -  06-30-25 @ 14:21  --------------------------------------------------------  IN: 620 mL / OUT: 0 mL / NET: 620 mL        PHYSICAL EXAM:  Constitutional: alert, NAD  Eyes: the sclera and conjunctiva were normal.   ENT: the ears and nose were normal in appearance.   Neck: the appearance of the neck was normal and the neck was supple.   Pulmonary: no respiratory distress  Vascular: there was no peripheral edema  Abdomen: normal bowel sounds, soft, L inguinal tenderness   : L testicular swelling and tenderness.   Neurological: no focal deficits.   Psychiatric: the affect was normal      LABS:                        9.0    11.20 )-----------( 251      ( 30 Jun 2025 05:30 )             27.8     06-30    138  |  104  |  12  ----------------------------<  110[H]  3.8   |  23  |  0.68    Ca    8.6      30 Jun 2025 05:30  Phos  2.2     06-30  Mg     2.0     06-30        Urinalysis Basic - ( 30 Jun 2025 05:30 )    Color: x / Appearance: x / SG: x / pH: x  Gluc: 110 mg/dL / Ketone: x  / Bili: x / Urobili: x   Blood: x / Protein: x / Nitrite: x   Leuk Esterase: x / RBC: x / WBC x   Sq Epi: x / Non Sq Epi: x / Bacteria: x        MICROBIOLOGY:  reviewed     RADIOLOGY & ADDITIONAL STUDIES:  reviewed

## 2025-07-01 ENCOUNTER — TRANSCRIPTION ENCOUNTER (OUTPATIENT)
Age: 65
End: 2025-07-01

## 2025-07-01 VITALS
TEMPERATURE: 98 F | SYSTOLIC BLOOD PRESSURE: 130 MMHG | OXYGEN SATURATION: 98 % | RESPIRATION RATE: 16 BRPM | DIASTOLIC BLOOD PRESSURE: 80 MMHG | HEART RATE: 67 BPM

## 2025-07-01 LAB
ANION GAP SERPL CALC-SCNC: 9 MMOL/L — SIGNIFICANT CHANGE UP (ref 5–17)
BUN SERPL-MCNC: 15 MG/DL — SIGNIFICANT CHANGE UP (ref 7–23)
CALCIUM SERPL-MCNC: 8.8 MG/DL — SIGNIFICANT CHANGE UP (ref 8.4–10.5)
CHLORIDE SERPL-SCNC: 105 MMOL/L — SIGNIFICANT CHANGE UP (ref 96–108)
CO2 SERPL-SCNC: 25 MMOL/L — SIGNIFICANT CHANGE UP (ref 22–31)
CREAT SERPL-MCNC: 0.75 MG/DL — SIGNIFICANT CHANGE UP (ref 0.5–1.3)
EGFR: 101 ML/MIN/1.73M2 — SIGNIFICANT CHANGE UP
EGFR: 101 ML/MIN/1.73M2 — SIGNIFICANT CHANGE UP
GLUCOSE SERPL-MCNC: 104 MG/DL — HIGH (ref 70–99)
HCT VFR BLD CALC: 27.9 % — LOW (ref 39–50)
HGB BLD-MCNC: 9.1 G/DL — LOW (ref 13–17)
MCHC RBC-ENTMCNC: 30.3 PG — SIGNIFICANT CHANGE UP (ref 27–34)
MCHC RBC-ENTMCNC: 32.6 G/DL — SIGNIFICANT CHANGE UP (ref 32–36)
MCV RBC AUTO: 93 FL — SIGNIFICANT CHANGE UP (ref 80–100)
NRBC # BLD AUTO: 0 K/UL — SIGNIFICANT CHANGE UP (ref 0–0)
NRBC # FLD: 0 K/UL — SIGNIFICANT CHANGE UP (ref 0–0)
NRBC BLD AUTO-RTO: 0 /100 WBCS — SIGNIFICANT CHANGE UP (ref 0–0)
PLATELET # BLD AUTO: 289 K/UL — SIGNIFICANT CHANGE UP (ref 150–400)
PMV BLD: 10.8 FL — SIGNIFICANT CHANGE UP (ref 7–13)
POTASSIUM SERPL-MCNC: 3.8 MMOL/L — SIGNIFICANT CHANGE UP (ref 3.5–5.3)
POTASSIUM SERPL-SCNC: 3.8 MMOL/L — SIGNIFICANT CHANGE UP (ref 3.5–5.3)
RBC # BLD: 3 M/UL — LOW (ref 4.2–5.8)
RBC # FLD: 13.3 % — SIGNIFICANT CHANGE UP (ref 10.3–14.5)
SODIUM SERPL-SCNC: 139 MMOL/L — SIGNIFICANT CHANGE UP (ref 135–145)
WBC # BLD: 7.91 K/UL — SIGNIFICANT CHANGE UP (ref 3.8–10.5)
WBC # FLD AUTO: 7.91 K/UL — SIGNIFICANT CHANGE UP (ref 3.8–10.5)

## 2025-07-01 PROCEDURE — 81001 URINALYSIS AUTO W/SCOPE: CPT

## 2025-07-01 PROCEDURE — 99232 SBSQ HOSP IP/OBS MODERATE 35: CPT

## 2025-07-01 PROCEDURE — 85027 COMPLETE CBC AUTOMATED: CPT

## 2025-07-01 PROCEDURE — 87077 CULTURE AEROBIC IDENTIFY: CPT

## 2025-07-01 PROCEDURE — 87186 SC STD MICRODIL/AGAR DIL: CPT

## 2025-07-01 PROCEDURE — 36415 COLL VENOUS BLD VENIPUNCTURE: CPT

## 2025-07-01 PROCEDURE — 83735 ASSAY OF MAGNESIUM: CPT

## 2025-07-01 PROCEDURE — 84100 ASSAY OF PHOSPHORUS: CPT

## 2025-07-01 PROCEDURE — 83605 ASSAY OF LACTIC ACID: CPT

## 2025-07-01 PROCEDURE — 36410 VNPNXR 3YR/> PHY/QHP DX/THER: CPT

## 2025-07-01 PROCEDURE — 87040 BLOOD CULTURE FOR BACTERIA: CPT

## 2025-07-01 PROCEDURE — 74177 CT ABD & PELVIS W/CONTRAST: CPT

## 2025-07-01 PROCEDURE — 76937 US GUIDE VASCULAR ACCESS: CPT

## 2025-07-01 PROCEDURE — 76937 US GUIDE VASCULAR ACCESS: CPT | Mod: 26

## 2025-07-01 PROCEDURE — 87086 URINE CULTURE/COLONY COUNT: CPT

## 2025-07-01 PROCEDURE — 80048 BASIC METABOLIC PNL TOTAL CA: CPT

## 2025-07-01 PROCEDURE — 76870 US EXAM SCROTUM: CPT

## 2025-07-01 PROCEDURE — 85025 COMPLETE CBC W/AUTO DIFF WBC: CPT

## 2025-07-01 PROCEDURE — 99285 EMERGENCY DEPT VISIT HI MDM: CPT

## 2025-07-01 RX ORDER — ERTAPENEM SODIUM 1 G/1
1 INJECTION, POWDER, LYOPHILIZED, FOR SOLUTION INTRAMUSCULAR; INTRAVENOUS
Qty: 0 | Refills: 0 | DISCHARGE
Start: 2025-07-01

## 2025-07-01 RX ADMIN — Medication 1 APPLICATION(S): at 11:29

## 2025-07-01 RX ADMIN — ERTAPENEM SODIUM 120 MILLIGRAM(S): 1 INJECTION, POWDER, LYOPHILIZED, FOR SOLUTION INTRAMUSCULAR; INTRAVENOUS at 11:29

## 2025-07-01 RX ADMIN — Medication 650 MILLIGRAM(S): at 15:58

## 2025-07-01 NOTE — DISCHARGE NOTE NURSING/CASE MANAGEMENT/SOCIAL WORK - FINANCIAL ASSISTANCE
[facility] provides services at a reduced cost to those who are determined to be eligible through [facility]’s financial assistance program. Information regarding [facility]’s financial assistance program can be found by going to [email or web address] or by calling [phone].

## 2025-07-01 NOTE — DISCHARGE NOTE PROVIDER - NSDCFUADDINST_GEN_ALL_CORE_FT
STay well hydrated, continue to advance diet as tolerated, you may shower. You are discharged home with a PICC line through which you will receive your antibiotics once a day until 7/26. You will also need to have weekly bloodwork drawn. Please call Dr Velez with fever>100.4, any questions and to set up your follow appointment.

## 2025-07-01 NOTE — PROGRESS NOTE ADULT - NS ATTEND AMEND GEN_ALL_CORE FT
Agree with above.  CT abd/pelvis suggests prostatitis.  Recommend 4 weeks of IV Ertapenem 1 gram daily.  Ok to place midline now given negative blood cultures x 24hrs, low suspicion for bacteremia and the fact that it's a midline.  Needs weekly CBC with diff, CMP. Can follow up with me outpatient.  Fax labs to: [phone]. ID team 2 will sign off.  Reconsult as needed

## 2025-07-01 NOTE — DISCHARGE NOTE PROVIDER - HOSPITAL COURSE
65 yo m with UTI following Holep 6/5, had cultures drawn, urine culture + ESBL klebsiella pneumonia, was seen by ID who is treating patient for prostatitis and recommends 4 weeks course of ertapenum, PICC line placed 7/1, patient afebrile, tolerating po diet, ambulatory and hemodynamically stable.

## 2025-07-01 NOTE — DISCHARGE NOTE PROVIDER - CARE PROVIDER_API CALL
Eric Velez  Urology  [address]  [place] [address]  Phone: [phone]  Fax: [phone]  Follow Up Time:

## 2025-07-01 NOTE — DISCHARGE NOTE PROVIDER - NSDCCPCAREPLAN_GEN_ALL_CORE_FT
PRINCIPAL DISCHARGE DIAGNOSIS  Diagnosis: Urinary tract infection due to ESBL Klebsiella  Assessment and Plan of Treatment:

## 2025-07-01 NOTE — DISCHARGE NOTE PROVIDER - NSDCMRMEDTOKEN_GEN_ALL_CORE_FT
ertapenem 1 g injection: 1 gram(s) injectable once a day through 7/26 via PICC  Weekly CBC and BMP: As above

## 2025-07-01 NOTE — PROGRESS NOTE ADULT - ASSESSMENT
ASSESSMENT: 64yMale w/ fevers s/p HoLep 6/5/25.  Patient VSS, HDS.       PLAN:  Diet: Reg  Pain control  Monitor Urine Output  DVT ppx: SCD  Cont Abx: Ertapenem  OOB/IS
64 year old male with BPH S/P Ejaculation preserving HoLEP 6/5/25 presents w/ fever last night and R testicular swelling x 2 days found to have UTI c/b L epididymoorchitis and symptoms concerning for prostatitis. Afebrile today with resolved leukocytosis. Ucx 6/23 (OSH) grew ESBL Kleb pneumoniae (in HIE). Urine culture growing GNRs. Bcxs ngtd 24 hours. CT AP with IVC suggestive of prostatitis.     Suggest:  -f/u blood cultures x 2 6/29   -f/u UCx    -continue Ertapenem 1g IV Q24   -Place midline. Ok with placing today- low suspicion for bacteremia and organism is a gram negative    -Duration of antibiotics is 4 weeks ( 6/29 - 7/26 )  -Weekly labs: CMP, CBC with diff faxed to ID office at [phone]  -Patient to follow up with Dr. Saenz in 2 weeks ([address], [phone]), ID office will call patient to schedule       Team 2 will sign off. Thank you for your consultation   Please recall if further ID input is desired   Case d/w primary team.  Final recommendation pending attending note.    Padma Leroy, Infectious Diseases PA  Please reach out for any questions 9 am-5pm.   For evenings and weekends, please call the ID physician on call.      
ASSESSMENT: 64yMale w/ post op UTI.  Patient VSS, HDS, and afebrile overnight.     PLAN:  Diet: Kosher  Pain control  Monitor Urine Output  DVT ppx: SCD  Cont Abx: Ertapenem  OOB/IS

## 2025-07-01 NOTE — PROGRESS NOTE ADULT - SUBJECTIVE AND OBJECTIVE BOX
INFECTIOUS DISEASES CONSULT FOLLOW-UP NOTE    INTERVAL HPI/OVERNIGHT EVENTS:    Patient seen and examined at bedside. UTE. Afebrile. States L testicular pain is improving, rectal pain unchanged.      ROS:   Constitutional, eyes, ENT, cardiovascular, respiratory, gastrointestinal, genitourinary, integumentary, neurological, psychiatric and heme/lymph are otherwise negative other than noted above       ANTIBIOTICS/RELEVANT:    MEDICATIONS  (STANDING):  chlorhexidine 2% Cloths 1 Application(s) Topical daily  ertapenem  IVPB 1000 milliGRAM(s) IV Intermittent every 24 hours    MEDICATIONS  (PRN):  acetaminophen     Tablet .. 650 milliGRAM(s) Oral every 6 hours PRN Moderate Pain (4 - 6)  ibuprofen  Tablet. 400 milliGRAM(s) Oral every 6 hours PRN Mild Pain (1 - 3)  oxyCODONE    IR 5 milliGRAM(s) Oral every 6 hours PRN Severe Pain (7 - 10)        Vital Signs Last 24 Hrs  T(C): 37.2 (01 Jul 2025 08:40), Max: 37.3 (01 Jul 2025 05:10)  T(F): 99 (01 Jul 2025 08:40), Max: 99.1 (01 Jul 2025 05:10)  HR: 69 (01 Jul 2025 08:40) (69 - 79)  BP: 112/75 (01 Jul 2025 08:40) (100/62 - 118/76)  BP(mean): --  RR: 18 (01 Jul 2025 08:40) (17 - 18)  SpO2: 97% (01 Jul 2025 08:40) (94% - 97%)    Parameters below as of 01 Jul 2025 08:40  Patient On (Oxygen Delivery Method): room air        06-30-25 @ 07:01  -  07-01-25 @ 07:00  --------------------------------------------------------  IN: 970 mL / OUT: 700 mL / NET: 270 mL      PHYSICAL EXAM:  Constitutional: alert, NAD  Eyes: the sclera and conjunctiva were normal.   ENT: the ears and nose were normal in appearance.   Neck: the appearance of the neck was normal and the neck was supple.   Pulmonary: no respiratory distress  Vascular: there was no peripheral edema  Abdomen: normal bowel sounds, soft, L inguinal tenderness   : L testicular swelling and tenderness - improved.   Neurological: no focal deficits.   Psychiatric: the affect was normal            LABS:                        9.1    7.91  )-----------( 289      ( 01 Jul 2025 05:30 )             27.9     07-01    139  |  105  |  15  ----------------------------<  104[H]  3.8   |  25  |  0.75    Ca    8.8      01 Jul 2025 05:30  Phos  2.2     06-30  Mg     2.0     06-30        Urinalysis Basic - ( 01 Jul 2025 05:30 )    Color: x / Appearance: x / SG: x / pH: x  Gluc: 104 mg/dL / Ketone: x  / Bili: x / Urobili: x   Blood: x / Protein: x / Nitrite: x   Leuk Esterase: x / RBC: x / WBC x   Sq Epi: x / Non Sq Epi: x / Bacteria: x        MICROBIOLOGY:    Culture - Blood (collected 06-29-25 @ 04:46)  Source: Blood Blood  Preliminary Report (06-30-25 @ 18:02):    No growth at 24 hours    Culture - Urine (collected 06-29-25 @ 04:46)  Source: Clean Catch Clean Catch (Midstream)  Preliminary Report (06-30-25 @ 19:20):    >100,000 CFU/ml Gram Negative Rods    Culture - Blood (collected 06-29-25 @ 04:46)  Source: Blood Blood  Preliminary Report (06-30-25 @ 18:02):    No growth at 24 hours        RADIOLOGY & ADDITIONAL STUDIES:  Reviewed
INTERVAL HPI/OVERNIGHT EVENTS:  No acute events overnight.    VITALS:    T(F): 99 (06-30-25 @ 20:11), Max: 99 (06-30-25 @ 20:11)  HR: 79 (06-30-25 @ 20:11) (73 - 79)  BP: 100/62 (06-30-25 @ 20:11) (100/62 - 110/76)  RR: 17 (06-30-25 @ 20:11) (17 - 18)  SpO2: 94% (06-30-25 @ 20:11) (93% - 97%)  Wt(kg): --    I&O's Detail    29 Jun 2025 07:01  -  30 Jun 2025 07:00  --------------------------------------------------------  IN:    Lactated Ringers: 660 mL    Oral Fluid: 120 mL  Total IN: 780 mL    OUT:    Voided (mL): 1500 mL  Total OUT: 1500 mL    Total NET: -720 mL      30 Jun 2025 07:01  -  01 Jul 2025 05:13  --------------------------------------------------------  IN:    IV PiggyBack: 50 mL    Lactated Ringers: 120 mL    Oral Fluid: 800 mL  Total IN: 970 mL    OUT:  Total OUT: 0 mL    Total NET: 970 mL          MEDICATIONS:    ANTIBIOTICS:  ertapenem  IVPB 1000 milliGRAM(s) IV Intermittent every 24 hours      PAIN CONTROL:  acetaminophen     Tablet .. 650 milliGRAM(s) Oral every 6 hours PRN  ibuprofen  Tablet. 400 milliGRAM(s) Oral every 6 hours PRN  oxyCODONE    IR 5 milliGRAM(s) Oral every 6 hours PRN       MEDS:      HEME/ONC        PHYSICAL EXAM:  General: No acute distress.  Alert and Oriented x 3   Abdominal Exam: soft nt/nd.   Exam: Negative SP tenderness.       LABS:                        9.0    11.20 )-----------( 251      ( 30 Jun 2025 05:30 )             27.8     06-30    138  |  104  |  12  ----------------------------<  110[H]  3.8   |  23  |  0.68    Ca    8.6      30 Jun 2025 05:30  Phos  2.2     06-30  Mg     2.0     06-30        Urinalysis Basic - ( 30 Jun 2025 05:30 )    Color: x / Appearance: x / SG: x / pH: x  Gluc: 110 mg/dL / Ketone: x  / Bili: x / Urobili: x   Blood: x / Protein: x / Nitrite: x   Leuk Esterase: x / RBC: x / WBC x   Sq Epi: x / Non Sq Epi: x / Bacteria: x        RADIOLOGY & ADDITIONAL TESTS:      
INTERVAL HPI/OVERNIGHT EVENTS:  No acute events overnight.  Patient Tmax 100.9F at 1655, last fever at 1802 w/ 100.4F.  Patient has been afebrile since.  No acute complaints.    VITALS:    T(F): 99.7 (25 @ 05:12), Max: 100.9 (25 @ 16:55)  HR: 78 (25 @ 05:12) (75 - 86)  BP: 108/68 (25 @ 05:12) (99/61 - 120/65)  RR: 17 (25 @ 05:12) (16 - 18)  SpO2: 94% (25 @ 05:12) (94% - 98%)  Wt(kg): --    I&O's Detail    2025 07:01  -  2025 05:58  --------------------------------------------------------  IN:    Lactated Ringers: 660 mL    Oral Fluid: 120 mL  Total IN: 780 mL    OUT:    Voided (mL): 1500 mL  Total OUT: 1500 mL    Total NET: -720 mL          MEDICATIONS:    ANTIBIOTICS:  ertapenem  IVPB 1000 milliGRAM(s) IV Intermittent every 24 hours      PAIN CONTROL:  acetaminophen     Tablet .. 650 milliGRAM(s) Oral every 6 hours PRN  ibuprofen  Tablet. 400 milliGRAM(s) Oral every 6 hours PRN  oxyCODONE    IR 5 milliGRAM(s) Oral every 6 hours PRN       MEDS:      HEME/ONC        PHYSICAL EXAM:  General: No acute distress.  Alert and Oriented x 3  Abdominal Exam: soft nt/nd.   Exam: Negative SP tenderness      LABS:                        9.8    8.85  )-----------( 260      ( 2025 04:46 )             30.4     -    138  |  102  |  17  ----------------------------<  166[H]  4.2   |  25  |  0.82    Ca    8.9      2025 04:46        Urinalysis Basic - ( 2025 04:46 )    Color: Yellow / Appearance: Turbid / S.025 / pH: x  Gluc: 166 mg/dL / Ketone: x  / Bili: Negative / Urobili: 1.0 mg/dL   Blood: x / Protein: 100 mg/dL / Nitrite: Positive   Leuk Esterase: Large / RBC: 111 /HPF / WBC >998 /HPF   Sq Epi: x / Non Sq Epi: 0 /HPF / Bacteria: Moderate /HPF        RADIOLOGY & ADDITIONAL TESTS:

## 2025-07-01 NOTE — DISCHARGE NOTE NURSING/CASE MANAGEMENT/SOCIAL WORK - PATIENT PORTAL LINK FT
You can access the FollowMyHealth Patient Portal offered by [facility] by registering at the following website: [email or web address]. By joining [employer]’s FollowMyHealth portal, you will also be able to view your health information using other applications (apps) compatible with our system.

## 2025-07-02 LAB
-  AMOXICILLIN/CLAVULANIC ACID: SIGNIFICANT CHANGE UP
-  AMPICILLIN/SULBACTAM: SIGNIFICANT CHANGE UP
-  AMPICILLIN: SIGNIFICANT CHANGE UP
-  CEFAZOLIN: SIGNIFICANT CHANGE UP
-  CEFEPIME: SIGNIFICANT CHANGE UP
-  CEFOXITIN: SIGNIFICANT CHANGE UP
-  CEFTRIAXONE: SIGNIFICANT CHANGE UP
-  CIPROFLOXACIN: SIGNIFICANT CHANGE UP
-  GENTAMICIN: SIGNIFICANT CHANGE UP
-  LEVOFLOXACIN: SIGNIFICANT CHANGE UP
-  NITROFURANTOIN: SIGNIFICANT CHANGE UP
-  PIPERACILLIN/TAZOBACTAM: SIGNIFICANT CHANGE UP
-  TIGECYCLINE: SIGNIFICANT CHANGE UP
-  TOBRAMYCIN: SIGNIFICANT CHANGE UP
-  TRIMETHOPRIM/SULFAMETHOXAZOLE: SIGNIFICANT CHANGE UP
CULTURE RESULTS: ABNORMAL
METHOD TYPE: SIGNIFICANT CHANGE UP
ORGANISM # SPEC MICROSCOPIC CNT: ABNORMAL
ORGANISM # SPEC MICROSCOPIC CNT: SIGNIFICANT CHANGE UP
SPECIMEN SOURCE: SIGNIFICANT CHANGE UP

## 2025-07-04 LAB
CULTURE RESULTS: SIGNIFICANT CHANGE UP
CULTURE RESULTS: SIGNIFICANT CHANGE UP
SPECIMEN SOURCE: SIGNIFICANT CHANGE UP
SPECIMEN SOURCE: SIGNIFICANT CHANGE UP

## 2025-07-15 ENCOUNTER — NON-APPOINTMENT (OUTPATIENT)
Age: 65
End: 2025-07-15

## 2025-07-15 ENCOUNTER — APPOINTMENT (OUTPATIENT)
Dept: INFECTIOUS DISEASE | Facility: CLINIC | Age: 65
End: 2025-07-15

## 2025-07-15 DIAGNOSIS — T81.43XA INFECTION FOLLOWING A PROCEDURE, ORGAN AND SPACE SURGICAL SITE, INITIAL ENCOUNTER: ICD-10-CM

## 2025-07-15 DIAGNOSIS — B96.1 KLEBSIELLA PNEUMONIAE [K. PNEUMONIAE] AS THE CAUSE OF DISEASES CLASSIFIED ELSEWHERE: ICD-10-CM

## 2025-07-15 DIAGNOSIS — N40.0 BENIGN PROSTATIC HYPERPLASIA WITHOUT LOWER URINARY TRACT SYMPTOMS: ICD-10-CM
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
## 2025-07-15 DIAGNOSIS — N41.9 INFLAMMATORY DISEASE OF PROSTATE, UNSPECIFIED: ICD-10-CM

## 2025-07-15 DIAGNOSIS — N45.3 EPIDIDYMO-ORCHITIS: ICD-10-CM

## 2025-07-15 DIAGNOSIS — N39.0 URINARY TRACT INFECTION, SITE NOT SPECIFIED: ICD-10-CM

## 2025-07-15 DIAGNOSIS — Z87.891 PERSONAL HISTORY OF NICOTINE DEPENDENCE: ICD-10-CM

## 2025-07-15 DIAGNOSIS — Z16.30 RESISTANCE TO UNSPECIFIED ANTIMICROBIAL DRUGS: ICD-10-CM

## 2025-07-15 DIAGNOSIS — E83.39 OTHER DISORDERS OF PHOSPHORUS METABOLISM: ICD-10-CM

## 2025-07-24 ENCOUNTER — NON-APPOINTMENT (OUTPATIENT)
Age: 65
End: 2025-07-24

## 2025-09-02 ENCOUNTER — NON-APPOINTMENT (OUTPATIENT)
Age: 65
End: 2025-09-02

## 2025-09-10 ENCOUNTER — RESULT CHARGE (OUTPATIENT)
Age: 65
End: 2025-09-10

## 2025-09-10 ENCOUNTER — APPOINTMENT (OUTPATIENT)
Dept: UROLOGY | Facility: CLINIC | Age: 65
End: 2025-09-10
Payer: COMMERCIAL

## 2025-09-10 VITALS
SYSTOLIC BLOOD PRESSURE: 121 MMHG | WEIGHT: 185 LBS | HEART RATE: 75 BPM | HEIGHT: 68 IN | TEMPERATURE: 97.7 F | DIASTOLIC BLOOD PRESSURE: 73 MMHG | BODY MASS INDEX: 28.04 KG/M2

## 2025-09-10 DIAGNOSIS — R33.9 RETENTION OF URINE, UNSPECIFIED: ICD-10-CM

## 2025-09-10 PROCEDURE — 51798 US URINE CAPACITY MEASURE: CPT

## 2025-09-10 PROCEDURE — G2211 COMPLEX E/M VISIT ADD ON: CPT

## 2025-09-10 PROCEDURE — 99214 OFFICE O/P EST MOD 30 MIN: CPT

## 2025-09-11 ENCOUNTER — NON-APPOINTMENT (OUTPATIENT)
Age: 65
End: 2025-09-11

## 2025-09-11 LAB
APPEARANCE: CLEAR
BACTERIA: NEGATIVE /HPF
BILIRUBIN URINE: NEGATIVE
BLOOD URINE: ABNORMAL
CAST: 2 /LPF
COLOR: YELLOW
EPITHELIAL CELLS: 2 /HPF
GLUCOSE QUALITATIVE U: NEGATIVE MG/DL
HYALINE CASTS: PRESENT
KETONES URINE: NEGATIVE MG/DL
LEUKOCYTE ESTERASE URINE: ABNORMAL
MICROSCOPIC-UA: NORMAL
NITRITE URINE: NEGATIVE
PH URINE: 5.5
PROTEIN URINE: 30 MG/DL
PSA SERPL-MCNC: 1.86 NG/ML
RED BLOOD CELLS URINE: 3 /HPF
REVIEW: NORMAL
SPECIFIC GRAVITY URINE: 1.03
UROBILINOGEN URINE: 0.2 MG/DL
WHITE BLOOD CELLS URINE: 60 /HPF

## 2025-09-12 ENCOUNTER — NON-APPOINTMENT (OUTPATIENT)
Age: 65
End: 2025-09-12

## 2025-09-12 LAB — BACTERIA UR CULT: NORMAL

## (undated) DEVICE — MEDELA OVERFLOW FILTER DISPOSABLE

## (undated) DEVICE — CLIPPER BLADE GENERAL USE

## (undated) DEVICE — ELCTR BIVAP BIPOLAR VAPORIZATION 26FR

## (undated) DEVICE — FOLEY CATH 3-WAY 20FR 30CC LATEX LUBRICATH

## (undated) DEVICE — CONTAINER TISSUE COLLECTING DISP

## (undated) DEVICE — PACK CYSTO

## (undated) DEVICE — TUBING TUR 2 PRONG

## (undated) DEVICE — DRAINAGE BAG URINARY 4L

## (undated) DEVICE — FOLEY CATH 3-WAY 22FR 30CC LATEX HEMATURIA COUDE

## (undated) DEVICE — GLV 7.5 PROTEXIS (WHITE)

## (undated) DEVICE — TUBING SET FOR SUCTION PUMP

## (undated) DEVICE — TAPE SILK 3"

## (undated) DEVICE — TUBING RANGER FLUID IRRIGATION SET DISP

## (undated) DEVICE — ELCTR CUTTING 24/26FR

## (undated) DEVICE — UROVAC

## (undated) DEVICE — SOL IRR BAG NS 0.9% 3000ML

## (undated) DEVICE — POSITIONER FOAM EGG CRATE ULNAR 2PCS (PINK)